# Patient Record
Sex: MALE | Race: WHITE | Employment: OTHER | URBAN - METROPOLITAN AREA
[De-identification: names, ages, dates, MRNs, and addresses within clinical notes are randomized per-mention and may not be internally consistent; named-entity substitution may affect disease eponyms.]

---

## 2022-03-18 PROBLEM — R97.20 ELEVATED PSA: Status: ACTIVE | Noted: 2017-03-02

## 2022-08-30 PROBLEM — R42 DIZZINESS: Status: ACTIVE | Noted: 2022-08-30

## 2022-08-30 PROBLEM — R77.8 ELEVATED TROPONIN: Status: ACTIVE | Noted: 2022-08-30

## 2022-08-30 PROBLEM — R79.89 ELEVATED TROPONIN: Status: ACTIVE | Noted: 2022-08-30

## 2022-08-30 PROBLEM — R55 NEAR SYNCOPE: Status: ACTIVE | Noted: 2022-08-30

## 2022-08-31 PROBLEM — I21.4 NSTEMI (NON-ST ELEVATED MYOCARDIAL INFARCTION) (HCC): Status: ACTIVE | Noted: 2022-08-31

## 2022-09-19 PROBLEM — F03.90 DEMENTIA (HCC): Status: ACTIVE | Noted: 2022-09-19

## 2022-09-29 PROBLEM — R77.8 ELEVATED TROPONIN: Status: RESOLVED | Noted: 2022-08-30 | Resolved: 2022-09-29

## 2022-09-29 PROBLEM — R79.89 ELEVATED TROPONIN: Status: RESOLVED | Noted: 2022-08-30 | Resolved: 2022-09-29

## 2023-01-05 PROBLEM — F03.90 DEMENTIA (HCC): Status: RESOLVED | Noted: 2022-09-19 | Resolved: 2023-01-05

## 2023-01-05 PROBLEM — D69.6 THROMBOCYTOPENIA (HCC): Status: ACTIVE | Noted: 2023-01-05

## 2023-01-05 PROBLEM — D70.9 NEUTROPENIA (HCC): Status: ACTIVE | Noted: 2023-01-05

## 2023-01-05 PROBLEM — R42 DIZZINESS: Status: RESOLVED | Noted: 2022-08-30 | Resolved: 2023-01-05

## 2023-01-05 PROBLEM — R97.20 ELEVATED PSA: Status: RESOLVED | Noted: 2017-03-02 | Resolved: 2023-01-05

## 2023-01-05 PROBLEM — R77.8 ELEVATED TROPONIN: Status: RESOLVED | Noted: 2022-08-30 | Resolved: 2023-01-05

## 2023-01-05 PROBLEM — N13.30 HYDRONEPHROSIS: Status: ACTIVE | Noted: 2023-01-05

## 2023-01-05 PROBLEM — R55 NEAR SYNCOPE: Status: RESOLVED | Noted: 2022-08-30 | Resolved: 2023-01-05

## 2023-01-05 PROBLEM — D61.818 PANCYTOPENIA (HCC): Status: ACTIVE | Noted: 2023-01-05

## 2023-01-05 PROBLEM — I21.4 NSTEMI (NON-ST ELEVATED MYOCARDIAL INFARCTION) (HCC): Status: RESOLVED | Noted: 2022-08-31 | Resolved: 2023-01-05

## 2023-01-05 PROBLEM — D64.9 ANEMIA: Status: ACTIVE | Noted: 2023-01-05

## 2023-01-05 PROBLEM — R79.89 ELEVATED TROPONIN: Status: RESOLVED | Noted: 2022-08-30 | Resolved: 2023-01-05

## 2023-01-06 PROBLEM — D69.3 IDIOPATHIC THROMBOCYTOPENIC PURPURA (ITP) (HCC): Status: ACTIVE | Noted: 2023-01-05

## 2023-04-25 ENCOUNTER — APPOINTMENT (OUTPATIENT)
Dept: INFUSION THERAPY | Age: 88
End: 2023-04-25
Payer: MEDICARE

## 2023-04-25 ENCOUNTER — HOSPITAL ENCOUNTER (OUTPATIENT)
Dept: INFUSION THERAPY | Age: 88
Discharge: HOME | End: 2023-04-25
Payer: MEDICARE

## 2023-04-25 ENCOUNTER — OFFICE VISIT (OUTPATIENT)
Dept: ONCOLOGY | Age: 88
End: 2023-04-25
Payer: MEDICARE

## 2023-04-25 VITALS
HEART RATE: 63 BPM | DIASTOLIC BLOOD PRESSURE: 65 MMHG | RESPIRATION RATE: 18 BRPM | SYSTOLIC BLOOD PRESSURE: 145 MMHG | OXYGEN SATURATION: 98 % | TEMPERATURE: 97.8 F

## 2023-04-25 VITALS
TEMPERATURE: 98.4 F | DIASTOLIC BLOOD PRESSURE: 69 MMHG | RESPIRATION RATE: 20 BRPM | BODY MASS INDEX: 26.14 KG/M2 | WEIGHT: 193 LBS | HEART RATE: 63 BPM | HEIGHT: 72 IN | SYSTOLIC BLOOD PRESSURE: 124 MMHG

## 2023-04-25 DIAGNOSIS — M25.512 CHRONIC LEFT SHOULDER PAIN: ICD-10-CM

## 2023-04-25 DIAGNOSIS — M21.922 ACQUIRED DEFORMITY OF LEFT SHOULDER: ICD-10-CM

## 2023-04-25 DIAGNOSIS — G89.29 CHRONIC LEFT SHOULDER PAIN: ICD-10-CM

## 2023-04-25 DIAGNOSIS — D69.3 IDIOPATHIC THROMBOCYTOPENIC PURPURA (ITP) (HCC): Primary | ICD-10-CM

## 2023-04-25 DIAGNOSIS — Z79.899 MEDICATION MANAGEMENT: ICD-10-CM

## 2023-04-25 LAB
ALBUMIN SERPL-MCNC: 3.8 G/DL (ref 3.5–5)
ALBUMIN/GLOB SERPL: 1.2 (ref 1.1–2.2)
ALP SERPL-CCNC: 98 U/L (ref 45–117)
ALT SERPL-CCNC: 24 U/L (ref 12–78)
ANION GAP SERPL CALC-SCNC: 0 MMOL/L (ref 5–15)
AST SERPL-CCNC: 23 U/L (ref 15–37)
BASOPHILS # BLD: 0.1 K/UL (ref 0–0.1)
BASOPHILS NFR BLD: 1 % (ref 0–1)
BILIRUB SERPL-MCNC: 0.5 MG/DL (ref 0.2–1)
BUN SERPL-MCNC: 19 MG/DL (ref 6–20)
BUN/CREAT SERPL: 19 (ref 12–20)
CALCIUM SERPL-MCNC: 9.2 MG/DL (ref 8.5–10.1)
CHLORIDE SERPL-SCNC: 108 MMOL/L (ref 97–108)
CO2 SERPL-SCNC: 31 MMOL/L (ref 21–32)
CREAT SERPL-MCNC: 1 MG/DL (ref 0.7–1.3)
DIFFERENTIAL METHOD BLD: ABNORMAL
EOSINOPHIL # BLD: 0.3 K/UL (ref 0–0.4)
EOSINOPHIL NFR BLD: 4 % (ref 0–7)
ERYTHROCYTE [DISTWIDTH] IN BLOOD BY AUTOMATED COUNT: 13.2 % (ref 11.5–14.5)
GLOBULIN SER CALC-MCNC: 3.1 G/DL (ref 2–4)
GLUCOSE SERPL-MCNC: 174 MG/DL (ref 65–100)
HCT VFR BLD AUTO: 36.1 % (ref 36.6–50.3)
HGB BLD-MCNC: 11.9 G/DL (ref 12.1–17)
IMM GRANULOCYTES # BLD AUTO: 0 K/UL (ref 0–0.04)
IMM GRANULOCYTES NFR BLD AUTO: 0 % (ref 0–0.5)
LYMPHOCYTES # BLD: 1.6 K/UL (ref 0.8–3.5)
LYMPHOCYTES NFR BLD: 24 % (ref 12–49)
MCH RBC QN AUTO: 30.1 PG (ref 26–34)
MCHC RBC AUTO-ENTMCNC: 33 G/DL (ref 30–36.5)
MCV RBC AUTO: 91.2 FL (ref 80–99)
MONOCYTES # BLD: 0.6 K/UL (ref 0–1)
MONOCYTES NFR BLD: 9 % (ref 5–13)
NEUTS SEG # BLD: 4.2 K/UL (ref 1.8–8)
NEUTS SEG NFR BLD: 62 % (ref 32–75)
NRBC # BLD: 0 K/UL (ref 0–0.01)
NRBC BLD-RTO: 0 PER 100 WBC
PLATELET # BLD AUTO: 150 K/UL (ref 150–400)
PMV BLD AUTO: 9.3 FL (ref 8.9–12.9)
POTASSIUM SERPL-SCNC: 3.8 MMOL/L (ref 3.5–5.1)
PROT SERPL-MCNC: 6.9 G/DL (ref 6.4–8.2)
RBC # BLD AUTO: 3.96 M/UL (ref 4.1–5.7)
SODIUM SERPL-SCNC: 139 MMOL/L (ref 136–145)
WBC # BLD AUTO: 6.7 K/UL (ref 4.1–11.1)

## 2023-04-25 PROCEDURE — G8417 CALC BMI ABV UP PARAM F/U: HCPCS | Performed by: INTERNAL MEDICINE

## 2023-04-25 PROCEDURE — 1123F ACP DISCUSS/DSCN MKR DOCD: CPT | Performed by: INTERNAL MEDICINE

## 2023-04-25 PROCEDURE — 80053 COMPREHEN METABOLIC PANEL: CPT

## 2023-04-25 PROCEDURE — G0463 HOSPITAL OUTPT CLINIC VISIT: HCPCS | Performed by: INTERNAL MEDICINE

## 2023-04-25 PROCEDURE — G8536 NO DOC ELDER MAL SCRN: HCPCS | Performed by: INTERNAL MEDICINE

## 2023-04-25 PROCEDURE — G8432 DEP SCR NOT DOC, RNG: HCPCS | Performed by: INTERNAL MEDICINE

## 2023-04-25 PROCEDURE — 99214 OFFICE O/P EST MOD 30 MIN: CPT | Performed by: INTERNAL MEDICINE

## 2023-04-25 PROCEDURE — 1101F PT FALLS ASSESS-DOCD LE1/YR: CPT | Performed by: INTERNAL MEDICINE

## 2023-04-25 PROCEDURE — G8427 DOCREV CUR MEDS BY ELIG CLIN: HCPCS | Performed by: INTERNAL MEDICINE

## 2023-04-25 PROCEDURE — 36415 COLL VENOUS BLD VENIPUNCTURE: CPT

## 2023-04-25 PROCEDURE — 85025 COMPLETE CBC W/AUTO DIFF WBC: CPT

## 2023-04-25 RX ORDER — SODIUM CHLORIDE 0.9 % (FLUSH) 0.9 %
5-40 SYRINGE (ML) INJECTION AS NEEDED
OUTPATIENT
Start: 2023-05-23

## 2023-04-25 RX ORDER — SODIUM CHLORIDE 9 MG/ML
5-40 INJECTION INTRAVENOUS AS NEEDED
OUTPATIENT
Start: 2023-05-23

## 2023-04-25 RX ORDER — SODIUM CHLORIDE 9 MG/ML
5-250 INJECTION, SOLUTION INTRAVENOUS AS NEEDED
OUTPATIENT
Start: 2023-05-23

## 2023-04-25 NOTE — PROGRESS NOTES
[facility] [place] at [place] [address] [address] [facility]  Ravin Dunks: [phone]  F: [phone]      Reason for Visit:   Arden Ly is a 82 y.o. male who is seen for follow up of ITP. Hematology Oncology Treatment History:     Diagnosis: ITP    Pathology:   2/1/23 Bone marrow biopsy: Normocellular marrow with maturing trilineage hematopoiesis including   mildly left shifted granulopoiesis without increase in blasts, see comment. Flow cytometry shows no diagnostic immunophenotypic abnormalities   Peripheral Blood:   Normochromic, normocytic anemia with no increase in schistocytes   Thrombocytopenia. White blood cells normal in number and morphology   CYTOGENETICS - Karyotype:45,X,-Y[16]/46,XY[4]; Interpretation: ABNORMAL MALE KARYOTYPE     Prior Treatment: None    Current Treatment: Promacta 50mg/d started 2/23/23, decreased to Promacta 25mg 3/14/23 and decreased to Promacta 12.5mg on 3/28/23. History of Present Illness:   Arden Ly is a pleasant 82 y.o. male with dementia who is seen for follow up of ITP. Patient was previously hospitalized at [address] [place] [address] in 9/2022 for thrombocytopenia and treated with Dex 40 mg/d x 4 days given concern for ITP. HIT/ MICHAEL at that time were negative. Pt responded well with steroids with normalization of PLT count. Pt had COVID infection in late Nov 2022, but had only mild symptoms. ITP relapse in early Jan 2023 was treated with Dex 40mg/d x 4 days with short-lived response. Then received 2 days of IVIG on 1/18/23 and 1/19/23. Pt ambulates without assistance. He lives at [facility]; has been there since Sept 2022. Has 2 daughters. Chantal Edwards lives locally and 2nd daughter lives in [facility] History: Patient is seen for relapsed ITP. He continues on Promacta 12.5mg PO daily for the last week. Was taking 25mg every other day until recently. Denies melena/hematochezia, hematuria or bleeding gums.  No rash or itching. Patient fell on is side and has been having left shoulder pain. He saw Orthopedics and was told he has dislocated shoulder. Has been receiving some type of pain patch. Accompanied by daughter, Corrine Falling. PMHX: BPH, Dementia, ED, HTN, Hydronephrosis, HLD, Right fascicular block  PSurgHx: None  SHX: , has significant other currently. Lives in [address] [place] [address]. Never smoker, no EtOH. FHX: No known h/o malignancy or blood disorders    Current Outpatient Medications   Medication Sig    ACETAMINOPHEN PO Take  by mouth. eltrombopag (Promacta) 12.5 mg tablet Take 1 Tablet by mouth Daily (before breakfast). donepeziL (ARICEPT) 5 mg tablet Take 1 Tablet by mouth nightly. QUEtiapine (SEROquel) 50 mg tablet Take 1 Tablet by mouth two (2) times a day. finasteride (PROSCAR) 5 mg tablet TAKE 1 TABLET DAILY    atorvastatin (LIPITOR) 40 mg tablet 10 mg. No current facility-administered medications for this visit. No Known Allergies   Review of Systems: A complete review of systems was obtained, reviewed. Pertinent findings reviewed above. Physical Exam:   Visit Vitals  /69 (BP 1 Location: Right upper arm, BP Patient Position: Sitting, BP Cuff Size: Large adult)   Pulse 63   Temp 98.4 °F (36.9 °C) (Temporal)   Resp 20   Ht 6' (1.829 m)   Wt 193 lb (87.5 kg)   BMI 26.18 kg/m²     ECOG PS: 2-3  General: no distress  Eyes: anicteric sclerae  HENT: oropharynx clear  Neck: supple  Lymphatic: deferred today   Respiratory: normal respiratory effort, CTAB  GI: soft, nontender, nondistended, no masses  Skin: no petechiae, rashes. Ecchymosis noted on bilateral arms. Deformity present on left shoulder with tenderness to palpation and bruising present. Neuro: limited judgement and insight due to dementia.       Results:     Lab Results   Component Value Date/Time    WBC 6.7 04/25/2023 09:10 AM    HGB 11.9 (L) 04/25/2023 09:10 AM    HCT 36.1 (L) 04/25/2023 09:10 AM    PLATELET 330 04/25/2023 09:10 AM    MCV 91.2 04/25/2023 09:10 AM    ABS. NEUTROPHILS 4.2 04/25/2023 09:10 AM     Lab Results   Component Value Date/Time    Sodium 137 04/11/2023 08:54 AM    Potassium 4.1 04/11/2023 08:54 AM    Chloride 106 04/11/2023 08:54 AM    CO2 28 04/11/2023 08:54 AM    Glucose 204 (H) 04/11/2023 08:54 AM    BUN 22 (H) 04/11/2023 08:54 AM    Creatinine 1.01 04/11/2023 08:54 AM    GFR est AA >60 09/21/2022 04:19 AM    GFR est non-AA >60 09/21/2022 04:19 AM    Calcium 8.8 04/11/2023 08:54 AM    Sodium (POC) 138 01/18/2023 12:08 PM    Potassium (POC) 4.5 01/18/2023 12:08 PM    Chloride (POC) 104 01/18/2023 12:08 PM    Glucose (POC) 76 01/18/2023 12:08 PM    Glucose (POC) 136 (H) 09/14/2022 04:38 PM    Creatinine (POC) 0.88 01/18/2023 12:08 PM    Calcium, ionized (POC) 1.13 01/18/2023 12:08 PM     Lab Results   Component Value Date/Time    Bilirubin, total 0.7 04/11/2023 08:54 AM    ALT (SGPT) 19 04/11/2023 08:54 AM    Alk.  phosphatase 94 04/11/2023 08:54 AM    Protein, total 7.0 04/11/2023 08:54 AM    Albumin 3.7 04/11/2023 08:54 AM    Globulin 3.3 04/11/2023 08:54 AM     Lab Results   Component Value Date/Time    Reticulocyte count 1.6 01/04/2023 10:00 PM    Iron % saturation 6 (L) 01/04/2023 11:49 PM    TIBC 274 01/04/2023 11:49 PM    Ferritin 146 01/04/2023 11:49 PM    Vitamin B12 284 01/04/2023 11:49 PM    Folate 17.6 01/05/2023 02:21 PM    Methylmalonic acid 141 01/06/2023 03:14 AM    Haptoglobin 146 01/04/2023 11:49 PM     01/04/2023 10:00 PM    Sed rate, automated 5 09/01/2022 08:50 AM    C-Reactive protein <0.29 09/01/2022 08:50 AM    TSH 3.13 09/09/2022 05:26 PM    M-Damon Not Observed 01/04/2023 11:49 PM       Lab Results   Component Value Date/Time    INR 1.1 01/04/2023 10:00 PM    aPTT 29.5 01/04/2023 10:00 PM    Fibrinogen 383 01/04/2023 10:00 PM      Lab Results   Component Value Date/Time    Prostate Specific Ag 4.3 (H) 04/10/2018 11:59 AM     01/04/2023 10:00 PM      PLATELET (K/uL) Date Value   04/11/2023 190   03/28/2023 207   03/21/2023 263   03/14/2023 237   03/07/2023 139 (L)   02/28/2023 99 (L)   02/14/2023 66 (L)   02/01/2023 85 (L)   01/19/2023 32 (LL)   01/18/2023 10 (LL)   01/06/2023 81 (L)   01/04/2023 33 (LL)   12/12/2022 252   09/23/2022 121 (L)   09/22/2022 46 (LL)   09/21/2022 18 (LL)   09/17/2022 30 (LL)   09/16/2022 36 (LL)   09/16/2022 40 (LL)   09/13/2022 54 (L)   09/12/2022 53 (L)   09/11/2022 61 (L)   09/10/2022 67 (L)   09/09/2022 68 (L)   09/08/2022 86 (L)   09/07/2022 95 (L)   09/06/2022 89 (L)   09/05/2022 73 (L)   09/04/2022 78 (L)   09/03/2022 91 (L)   09/01/2022 100 (L)   08/30/2022 92 (L)      1/4/2023 Peripheral Smear    Peripheral blood, smear:        Pancytopenia with absolute neutropenia and normocytic anemia (see   comment)   No circulating blasts, increase in schistocytes, or platelet clumping   identified   Pancytopenia has many potential contributing etiologies including   nutritional deficiency, medication effect, renal disease, and underlying   hematologic process such as myelodysplastic syndrome. Clinical correlation   is recommended; should cytopenias persist with no apparent explanation,   further evaluation including potential bone marrow biopsy may be   warranted. 1/14/23: WBC 6.5, Hgb 9.4, MCV 87, RDW 16.1, PLT 10  2/8/23: WBC 6.1, Hgb 10.1, MCV 86, RDW 16.8, PLT 38   2/23/23 started Promacta 50mg/d  3/1/23: WBC 8.2, HgB 11, MCV 88, RDW 16.7,   3/7/23:   3/14/23  - decreasing Promacta to 25mg/d  3/28/23:  - decreasing Promacta to 12.5mg daily. Assessment/Recommendations:   82 y.o. male with male with PMHx of dementia, HTN admitted with pancytopenia. Relapsed ITP:   Had prior response to pulse Dex in 9/2022 resulting in normal PLT count which is consistent with ITP diagnosis. Relapsed ITP in early Jan 2023 and only short-lived response to Dex 40mg/d x 4.  Did respond to IVIG on 1/18 and 1/19/23, but PLT count is now declining again. 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 Discussed treating with weekly Rituximab or splenectomy (which both carry a risk of immunosuppression), Promacta, Nplate, or Fostamatinib (which often require continuous dosing.) Discussed that treatment for refractory ITP is needed with PLT < 30-40 range. Patient has decided to proceed with Promacta. PLT count is currently 150.    -- Continue Promacta 12.5mg PO daily. ([facility] contact [phone])  -- Return for MD visit in and labs 4 weeks    PROMACTA DOSING:  - Use the lowest dose to achieve and maintain platelet count ? 50,000/mm3 as needed to reduce the risk of bleeding. Discontinue eltrombopag if platelet count does not respond to a level that avoids clinically important bleeding after 4 weeks at the maximum of 75 mg/day  - Dosage adjustment based on platelet response:  Platelet count <19,894/MI0 (?2 weeks after treatment initiation or a dose increase): Increase daily dose by 25 mg (if taking 12.5 mg once daily, increase dose to 25 mg once daily prior to increasing the dose amount by 25 mg daily); maximum: 75 mg/day. -Platelet count ? 200,000/mm3 and ?400,000/mm3 (at any time): Reduce daily dose by 25 mg (if taking 25 mg once daily, decrease dose to 12.5 mg once daily); reassess in 2 weeks.  -Platelet count >204,133/FD2: Withhold dose; assess platelet count twice weekly; when platelet count <199,046/BA5, resume with the daily dose reduced by 25 mg (if taking 25 mg once daily, resume with 12.5 mg once daily). -Platelet count >014,523/CZ9 after 2 weeks at the lowest dose: Discontinue treatment. 2. HTN:   Controlled on current medications. Hypotensive today but denies dizziness, CP or nausea. Encouraged good PO hydration. 3. Dementia:  Resides in [address] [place] [address]. 4. H/o fall / Left shoulder dislocation:   Not a candidate for surgery at this time. Orthopedics has evaluated and recommends conservative management. Advised fall precautions. Okay to use Lidocaine patches, Tylenol prn.        Signed By: Alondra Colindres MD

## 2023-04-25 NOTE — PROGRESS NOTES
[facility] Lab Visit:      [id number] arrived ambulatory and in no distress, labs drawn 1 stick in left wrist per KK  . Departed [facility] ambulatory and in no distress. Visit Vitals  BP (!) 145/65   Pulse 63   Temp 97.8 °F (36.6 °C)   Resp 18   SpO2 98%       Labs available in CC once resulted.

## 2023-04-25 NOTE — PROGRESS NOTES
1. Have you been to the ER, urgent care clinic since your last visit? Hospitalized since your last visit? No    2. Have you seen or consulted any other health care providers outside of the [address] [place] [address] since your last visit? Include any pap smears or colon screening.  No        Visit Vitals  /69 (BP 1 Location: Right upper arm, BP Patient Position: Sitting, BP Cuff Size: Large adult)   Pulse 63   Temp 98.4 °F (36.9 °C) (Temporal)   Resp 20   Ht 6' (1.829 m)   Wt 193 lb (87.5 kg)   BMI 26.18 kg/m²             Chief Complaint   Patient presents with    Follow-up

## 2023-05-01 DIAGNOSIS — D69.3 IDIOPATHIC THROMBOCYTOPENIC PURPURA (ITP) (HCC): Primary | ICD-10-CM

## 2023-05-01 RX ORDER — SODIUM CHLORIDE 0.9 % (FLUSH) 0.9 %
5-40 SYRINGE (ML) INJECTION PRN
OUTPATIENT
Start: 2023-05-09

## 2023-05-01 RX ORDER — SODIUM CHLORIDE 9 MG/ML
5-250 INJECTION, SOLUTION INTRAVENOUS PRN
OUTPATIENT
Start: 2023-05-09

## 2023-05-01 RX ORDER — HEPARIN SODIUM (PORCINE) LOCK FLUSH IV SOLN 100 UNIT/ML 100 UNIT/ML
500 SOLUTION INTRAVENOUS PRN
OUTPATIENT
Start: 2023-05-09

## 2023-05-09 ENCOUNTER — APPOINTMENT (OUTPATIENT)
Dept: INFUSION THERAPY | Age: 88
End: 2023-05-09

## 2023-05-09 ENCOUNTER — HOSPITAL ENCOUNTER (OUTPATIENT)
Dept: INFUSION THERAPY | Age: 88
End: 2023-05-09

## 2023-05-19 RX ORDER — FINASTERIDE 5 MG/1
1 TABLET, FILM COATED ORAL DAILY
COMMUNITY
Start: 2021-07-14

## 2023-05-19 RX ORDER — QUETIAPINE FUMARATE 50 MG/1
50 TABLET, FILM COATED ORAL 2 TIMES DAILY
COMMUNITY
Start: 2023-01-06

## 2023-05-19 RX ORDER — ATORVASTATIN CALCIUM 40 MG/1
10 TABLET, FILM COATED ORAL
COMMUNITY

## 2023-05-19 RX ORDER — DONEPEZIL HYDROCHLORIDE 5 MG/1
1 TABLET, FILM COATED ORAL NIGHTLY
COMMUNITY
Start: 2023-01-06

## 2023-05-23 ENCOUNTER — OFFICE VISIT (OUTPATIENT)
Age: 88
End: 2023-05-23
Payer: MEDICARE

## 2023-05-23 ENCOUNTER — HOSPITAL ENCOUNTER (OUTPATIENT)
Facility: HOSPITAL | Age: 88
Setting detail: INFUSION SERIES
End: 2023-05-23
Payer: MEDICARE

## 2023-05-23 VITALS
HEIGHT: 72 IN | HEART RATE: 64 BPM | BODY MASS INDEX: 26.06 KG/M2 | SYSTOLIC BLOOD PRESSURE: 127 MMHG | OXYGEN SATURATION: 99 % | DIASTOLIC BLOOD PRESSURE: 59 MMHG | WEIGHT: 192.4 LBS | TEMPERATURE: 97.5 F

## 2023-05-23 VITALS
DIASTOLIC BLOOD PRESSURE: 59 MMHG | HEART RATE: 64 BPM | TEMPERATURE: 97.5 F | SYSTOLIC BLOOD PRESSURE: 127 MMHG | OXYGEN SATURATION: 99 %

## 2023-05-23 DIAGNOSIS — D69.3 IDIOPATHIC THROMBOCYTOPENIC PURPURA (ITP) (HCC): Primary | ICD-10-CM

## 2023-05-23 DIAGNOSIS — F03.B0 MODERATE DEMENTIA WITHOUT BEHAVIORAL DISTURBANCE, PSYCHOTIC DISTURBANCE, MOOD DISTURBANCE, OR ANXIETY, UNSPECIFIED DEMENTIA TYPE (HCC): ICD-10-CM

## 2023-05-23 DIAGNOSIS — I10 ESSENTIAL (PRIMARY) HYPERTENSION: ICD-10-CM

## 2023-05-23 DIAGNOSIS — Z79.899 OTHER LONG TERM (CURRENT) DRUG THERAPY: ICD-10-CM

## 2023-05-23 DIAGNOSIS — Z51.81 ENCOUNTER FOR THERAPEUTIC DRUG LEVEL MONITORING: ICD-10-CM

## 2023-05-23 DIAGNOSIS — D69.3 IDIOPATHIC THROMBOCYTOPENIC PURPURA (ITP) (HCC): ICD-10-CM

## 2023-05-23 LAB
ALBUMIN SERPL-MCNC: 3.6 G/DL (ref 3.5–5)
ALBUMIN/GLOB SERPL: 1.2 (ref 1.1–2.2)
ALP SERPL-CCNC: 106 U/L (ref 45–117)
ALT SERPL-CCNC: 24 U/L (ref 12–78)
ANION GAP SERPL CALC-SCNC: 3 MMOL/L (ref 5–15)
AST SERPL-CCNC: 22 U/L (ref 15–37)
BASOPHILS # BLD: 0 K/UL (ref 0–0.1)
BASOPHILS NFR BLD: 1 % (ref 0–1)
BILIRUB SERPL-MCNC: 0.5 MG/DL (ref 0.2–1)
BUN SERPL-MCNC: 18 MG/DL (ref 6–20)
BUN/CREAT SERPL: 19 (ref 12–20)
CALCIUM SERPL-MCNC: 8.9 MG/DL (ref 8.5–10.1)
CHLORIDE SERPL-SCNC: 107 MMOL/L (ref 97–108)
CO2 SERPL-SCNC: 31 MMOL/L (ref 21–32)
CREAT SERPL-MCNC: 0.94 MG/DL (ref 0.7–1.3)
DIFFERENTIAL METHOD BLD: ABNORMAL
EOSINOPHIL # BLD: 0.2 K/UL (ref 0–0.4)
EOSINOPHIL NFR BLD: 3 % (ref 0–7)
ERYTHROCYTE [DISTWIDTH] IN BLOOD BY AUTOMATED COUNT: 12.8 % (ref 11.5–14.5)
GLOBULIN SER CALC-MCNC: 3 G/DL (ref 2–4)
GLUCOSE SERPL-MCNC: 196 MG/DL (ref 65–100)
HCT VFR BLD AUTO: 35.8 % (ref 36.6–50.3)
HGB BLD-MCNC: 11.9 G/DL (ref 12.1–17)
IMM GRANULOCYTES # BLD AUTO: 0 K/UL (ref 0–0.04)
IMM GRANULOCYTES NFR BLD AUTO: 0 % (ref 0–0.5)
LYMPHOCYTES # BLD: 1.5 K/UL (ref 0.8–3.5)
LYMPHOCYTES NFR BLD: 25 % (ref 12–49)
MCH RBC QN AUTO: 30 PG (ref 26–34)
MCHC RBC AUTO-ENTMCNC: 33.2 G/DL (ref 30–36.5)
MCV RBC AUTO: 90.2 FL (ref 80–99)
MONOCYTES # BLD: 0.4 K/UL (ref 0–1)
MONOCYTES NFR BLD: 7 % (ref 5–13)
NEUTS SEG # BLD: 3.7 K/UL (ref 1.8–8)
NEUTS SEG NFR BLD: 64 % (ref 32–75)
NRBC # BLD: 0 K/UL (ref 0–0.01)
NRBC BLD-RTO: 0 PER 100 WBC
PLATELET # BLD AUTO: 115 K/UL (ref 150–400)
PMV BLD AUTO: 9.6 FL (ref 8.9–12.9)
POTASSIUM SERPL-SCNC: 3.9 MMOL/L (ref 3.5–5.1)
PROT SERPL-MCNC: 6.6 G/DL (ref 6.4–8.2)
RBC # BLD AUTO: 3.97 M/UL (ref 4.1–5.7)
SODIUM SERPL-SCNC: 141 MMOL/L (ref 136–145)
WBC # BLD AUTO: 5.8 K/UL (ref 4.1–11.1)

## 2023-05-23 PROCEDURE — G8419 CALC BMI OUT NRM PARAM NOF/U: HCPCS | Performed by: INTERNAL MEDICINE

## 2023-05-23 PROCEDURE — 99214 OFFICE O/P EST MOD 30 MIN: CPT | Performed by: INTERNAL MEDICINE

## 2023-05-23 PROCEDURE — G8427 DOCREV CUR MEDS BY ELIG CLIN: HCPCS | Performed by: INTERNAL MEDICINE

## 2023-05-23 PROCEDURE — 85025 COMPLETE CBC W/AUTO DIFF WBC: CPT

## 2023-05-23 PROCEDURE — 36415 COLL VENOUS BLD VENIPUNCTURE: CPT

## 2023-05-23 PROCEDURE — 80053 COMPREHEN METABOLIC PANEL: CPT

## 2023-05-23 PROCEDURE — 1123F ACP DISCUSS/DSCN MKR DOCD: CPT | Performed by: INTERNAL MEDICINE

## 2023-05-23 PROCEDURE — 1036F TOBACCO NON-USER: CPT | Performed by: INTERNAL MEDICINE

## 2023-05-23 RX ORDER — SODIUM CHLORIDE 9 MG/ML
5-250 INJECTION, SOLUTION INTRAVENOUS PRN
OUTPATIENT
Start: 2023-05-23

## 2023-05-23 RX ORDER — SODIUM CHLORIDE 0.9 % (FLUSH) 0.9 %
5-40 SYRINGE (ML) INJECTION PRN
OUTPATIENT
Start: 2023-05-23

## 2023-05-23 RX ORDER — HEPARIN SODIUM (PORCINE) LOCK FLUSH IV SOLN 100 UNIT/ML 100 UNIT/ML
500 SOLUTION INTRAVENOUS PRN
OUTPATIENT
Start: 2023-05-23

## 2023-05-23 NOTE — PROGRESS NOTES
[facility] [place] at [address] [facility]   Anthony Luisito: [phone]  F: [phone]           Reason for Visit:   Lexx Varela is a 82 y.o. male who is seen for follow up of ITP. Hematology Oncology Treatment History:       Diagnosis: ITP     Pathology:   2/1/23 Bone marrow biopsy: Normocellular marrow with maturing trilineage hematopoiesis including   mildly left shifted granulopoiesis  without increase in blasts, see comment. Flow cytometry shows no diagnostic immunophenotypic abnormalities   Peripheral Blood:   Normochromic, normocytic anemia with no increase in schistocytes   Thrombocytopenia. White blood cells normal in number and morphology   CYTOGENETICS - Karyotype:45,X,-Y[16]/46,XY[4]; Interpretation: ABNORMAL MALE KARYOTYPE      Prior Treatment: None     Current Treatment: Promacta 50mg/d started 2/23/23, decreased to Promacta 25mg 3/14/23 and decreased to Promacta 12.5mg on 3/28/23. History of Present Illness:   Lexx Varela is a pleasant 82 y.o. male with dementia who is seen for follow up of ITP. Patient was previously hospitalized at [facility] in 9/2022 for thrombocytopenia and treated with Dex 40 mg/d x 4 days given concern for ITP. HIT/ THERESA at that time were negative. Pt responded well with steroids  with normalization of PLT count. Pt had COVID infection in late Nov 2022, but had only mild symptoms. ITP relapse in early Jan 2023 was treated with Dex 40mg/d x 4 days with short-lived response. Then received 2 days of IVIG on 1/18/23 and 1/19/23. Pt ambulates without assistance. He lives at [facility]; has been there since Sept 2022. Has 2 daughters. Renu Come lives locally and 2nd daughter lives in [facility] History: Patient is seen for relapsed ITP. He continues on Promacta 12.5mg PO daily for the last 4 weeks. Denies melena/hematochezia, hematuria, epistaxis or bleeding gums.  No rash or

## 2023-05-23 NOTE — PROGRESS NOTES
Fortunato Benavidez is a 82 y.o. male here for follow up of ITP. Patient with no complaints of pain at this time.

## 2023-05-30 RX ORDER — ELTROMBOPAG OLAMINE 12.5 MG/1
TABLET, FILM COATED ORAL
Qty: 30 TABLET | Refills: 1 | Status: ACTIVE | OUTPATIENT
Start: 2023-05-30 | End: 2023-07-17

## 2023-06-06 ENCOUNTER — APPOINTMENT (OUTPATIENT)
Facility: HOSPITAL | Age: 88
End: 2023-06-06
Payer: MEDICARE

## 2023-07-05 ENCOUNTER — HOSPITAL ENCOUNTER (OUTPATIENT)
Facility: HOSPITAL | Age: 88
Setting detail: INFUSION SERIES
End: 2023-07-05
Payer: MEDICARE

## 2023-07-05 VITALS
SYSTOLIC BLOOD PRESSURE: 120 MMHG | TEMPERATURE: 97.8 F | OXYGEN SATURATION: 96 % | HEART RATE: 57 BPM | DIASTOLIC BLOOD PRESSURE: 62 MMHG

## 2023-07-05 DIAGNOSIS — D69.3 IDIOPATHIC THROMBOCYTOPENIC PURPURA (ITP) (HCC): Primary | ICD-10-CM

## 2023-07-05 LAB
ALBUMIN SERPL-MCNC: 3.4 G/DL (ref 3.5–5)
ALBUMIN/GLOB SERPL: 1 (ref 1.1–2.2)
ALP SERPL-CCNC: 101 U/L (ref 45–117)
ALT SERPL-CCNC: 25 U/L (ref 12–78)
ANION GAP SERPL CALC-SCNC: 6 MMOL/L (ref 5–15)
AST SERPL-CCNC: 23 U/L (ref 15–37)
BASOPHILS # BLD: 0.1 K/UL (ref 0–0.1)
BASOPHILS NFR BLD: 1 % (ref 0–1)
BILIRUB SERPL-MCNC: 0.5 MG/DL (ref 0.2–1)
BUN SERPL-MCNC: 21 MG/DL (ref 6–20)
BUN/CREAT SERPL: 19 (ref 12–20)
CALCIUM SERPL-MCNC: 8.8 MG/DL (ref 8.5–10.1)
CHLORIDE SERPL-SCNC: 107 MMOL/L (ref 97–108)
CO2 SERPL-SCNC: 27 MMOL/L (ref 21–32)
CREAT SERPL-MCNC: 1.11 MG/DL (ref 0.7–1.3)
DIFFERENTIAL METHOD BLD: ABNORMAL
EOSINOPHIL # BLD: 0.3 K/UL (ref 0–0.4)
EOSINOPHIL NFR BLD: 4 % (ref 0–7)
ERYTHROCYTE [DISTWIDTH] IN BLOOD BY AUTOMATED COUNT: 12.9 % (ref 11.5–14.5)
GLOBULIN SER CALC-MCNC: 3.3 G/DL (ref 2–4)
GLUCOSE SERPL-MCNC: 190 MG/DL (ref 65–100)
HCT VFR BLD AUTO: 35.9 % (ref 36.6–50.3)
HGB BLD-MCNC: 11.8 G/DL (ref 12.1–17)
IMM GRANULOCYTES # BLD AUTO: 0 K/UL (ref 0–0.04)
IMM GRANULOCYTES NFR BLD AUTO: 0 % (ref 0–0.5)
LYMPHOCYTES # BLD: 1.4 K/UL (ref 0.8–3.5)
LYMPHOCYTES NFR BLD: 22 % (ref 12–49)
MCH RBC QN AUTO: 30.6 PG (ref 26–34)
MCHC RBC AUTO-ENTMCNC: 32.9 G/DL (ref 30–36.5)
MCV RBC AUTO: 93.2 FL (ref 80–99)
MONOCYTES # BLD: 0.6 K/UL (ref 0–1)
MONOCYTES NFR BLD: 9 % (ref 5–13)
NEUTS SEG # BLD: 4.2 K/UL (ref 1.8–8)
NEUTS SEG NFR BLD: 64 % (ref 32–75)
NRBC # BLD: 0 K/UL (ref 0–0.01)
NRBC BLD-RTO: 0 PER 100 WBC
PLATELET # BLD AUTO: 163 K/UL (ref 150–400)
PMV BLD AUTO: 9.8 FL (ref 8.9–12.9)
POTASSIUM SERPL-SCNC: 4.1 MMOL/L (ref 3.5–5.1)
PROT SERPL-MCNC: 6.7 G/DL (ref 6.4–8.2)
RBC # BLD AUTO: 3.85 M/UL (ref 4.1–5.7)
SODIUM SERPL-SCNC: 140 MMOL/L (ref 136–145)
WBC # BLD AUTO: 6.5 K/UL (ref 4.1–11.1)

## 2023-07-05 PROCEDURE — 36415 COLL VENOUS BLD VENIPUNCTURE: CPT

## 2023-07-05 PROCEDURE — 85025 COMPLETE CBC W/AUTO DIFF WBC: CPT

## 2023-07-05 PROCEDURE — 80053 COMPREHEN METABOLIC PANEL: CPT

## 2023-07-17 RX ORDER — ELTROMBOPAG OLAMINE 12.5 MG/1
TABLET, FILM COATED ORAL
Qty: 30 TABLET | Refills: 1 | Status: ACTIVE | OUTPATIENT
Start: 2023-07-17

## 2023-08-15 ENCOUNTER — HOSPITAL ENCOUNTER (OUTPATIENT)
Facility: HOSPITAL | Age: 88
Setting detail: INFUSION SERIES
End: 2023-08-15
Payer: MEDICARE

## 2023-08-17 ENCOUNTER — OFFICE VISIT (OUTPATIENT)
Age: 88
End: 2023-08-17
Payer: MEDICARE

## 2023-08-17 ENCOUNTER — HOSPITAL ENCOUNTER (OUTPATIENT)
Facility: HOSPITAL | Age: 88
Setting detail: INFUSION SERIES
End: 2023-08-17
Payer: MEDICARE

## 2023-08-17 VITALS
DIASTOLIC BLOOD PRESSURE: 62 MMHG | SYSTOLIC BLOOD PRESSURE: 133 MMHG | RESPIRATION RATE: 18 BRPM | WEIGHT: 192.4 LBS | BODY MASS INDEX: 26.06 KG/M2 | OXYGEN SATURATION: 97 % | TEMPERATURE: 98.2 F | HEART RATE: 55 BPM | HEIGHT: 72 IN

## 2023-08-17 VITALS
DIASTOLIC BLOOD PRESSURE: 62 MMHG | SYSTOLIC BLOOD PRESSURE: 133 MMHG | TEMPERATURE: 98.2 F | HEART RATE: 55 BPM | OXYGEN SATURATION: 97 %

## 2023-08-17 DIAGNOSIS — Z79.899 OTHER LONG TERM (CURRENT) DRUG THERAPY: ICD-10-CM

## 2023-08-17 DIAGNOSIS — F03.B0 MODERATE DEMENTIA WITHOUT BEHAVIORAL DISTURBANCE, PSYCHOTIC DISTURBANCE, MOOD DISTURBANCE, OR ANXIETY, UNSPECIFIED DEMENTIA TYPE (HCC): ICD-10-CM

## 2023-08-17 DIAGNOSIS — D69.3 IDIOPATHIC THROMBOCYTOPENIC PURPURA (ITP) (HCC): ICD-10-CM

## 2023-08-17 DIAGNOSIS — I10 ESSENTIAL (PRIMARY) HYPERTENSION: ICD-10-CM

## 2023-08-17 DIAGNOSIS — D69.3 IDIOPATHIC THROMBOCYTOPENIC PURPURA (ITP) (HCC): Primary | ICD-10-CM

## 2023-08-17 DIAGNOSIS — Z51.81 ENCOUNTER FOR THERAPEUTIC DRUG LEVEL MONITORING: ICD-10-CM

## 2023-08-17 LAB
ALBUMIN SERPL-MCNC: 3.4 G/DL (ref 3.5–5)
ALBUMIN/GLOB SERPL: 1.1 (ref 1.1–2.2)
ALP SERPL-CCNC: 98 U/L (ref 45–117)
ALT SERPL-CCNC: 21 U/L (ref 12–78)
ANION GAP SERPL CALC-SCNC: 4 MMOL/L (ref 5–15)
AST SERPL-CCNC: 17 U/L (ref 15–37)
BASOPHILS # BLD: 0.1 K/UL (ref 0–0.1)
BASOPHILS NFR BLD: 1 % (ref 0–1)
BILIRUB SERPL-MCNC: 0.7 MG/DL (ref 0.2–1)
BUN SERPL-MCNC: 24 MG/DL (ref 6–20)
BUN/CREAT SERPL: 24 (ref 12–20)
CALCIUM SERPL-MCNC: 8.9 MG/DL (ref 8.5–10.1)
CHLORIDE SERPL-SCNC: 110 MMOL/L (ref 97–108)
CO2 SERPL-SCNC: 26 MMOL/L (ref 21–32)
CREAT SERPL-MCNC: 1.01 MG/DL (ref 0.7–1.3)
DIFFERENTIAL METHOD BLD: ABNORMAL
EOSINOPHIL # BLD: 0.2 K/UL (ref 0–0.4)
EOSINOPHIL NFR BLD: 4 % (ref 0–7)
ERYTHROCYTE [DISTWIDTH] IN BLOOD BY AUTOMATED COUNT: 12.3 % (ref 11.5–14.5)
GLOBULIN SER CALC-MCNC: 3.1 G/DL (ref 2–4)
GLUCOSE SERPL-MCNC: 214 MG/DL (ref 65–100)
HCT VFR BLD AUTO: 36.8 % (ref 36.6–50.3)
HGB BLD-MCNC: 12 G/DL (ref 12.1–17)
IMM GRANULOCYTES # BLD AUTO: 0 K/UL (ref 0–0.04)
IMM GRANULOCYTES NFR BLD AUTO: 0 % (ref 0–0.5)
LYMPHOCYTES # BLD: 1.5 K/UL (ref 0.8–3.5)
LYMPHOCYTES NFR BLD: 23 % (ref 12–49)
MCH RBC QN AUTO: 30.3 PG (ref 26–34)
MCHC RBC AUTO-ENTMCNC: 32.6 G/DL (ref 30–36.5)
MCV RBC AUTO: 92.9 FL (ref 80–99)
MONOCYTES # BLD: 0.6 K/UL (ref 0–1)
MONOCYTES NFR BLD: 10 % (ref 5–13)
NEUTS SEG # BLD: 4 K/UL (ref 1.8–8)
NEUTS SEG NFR BLD: 62 % (ref 32–75)
NRBC # BLD: 0 K/UL (ref 0–0.01)
NRBC BLD-RTO: 0 PER 100 WBC
PLATELET # BLD AUTO: 166 K/UL (ref 150–400)
PMV BLD AUTO: 10.1 FL (ref 8.9–12.9)
POTASSIUM SERPL-SCNC: 4.2 MMOL/L (ref 3.5–5.1)
PROT SERPL-MCNC: 6.5 G/DL (ref 6.4–8.2)
RBC # BLD AUTO: 3.96 M/UL (ref 4.1–5.7)
SODIUM SERPL-SCNC: 140 MMOL/L (ref 136–145)
WBC # BLD AUTO: 6.4 K/UL (ref 4.1–11.1)

## 2023-08-17 PROCEDURE — 36415 COLL VENOUS BLD VENIPUNCTURE: CPT

## 2023-08-17 PROCEDURE — 99214 OFFICE O/P EST MOD 30 MIN: CPT | Performed by: INTERNAL MEDICINE

## 2023-08-17 PROCEDURE — 1036F TOBACCO NON-USER: CPT | Performed by: INTERNAL MEDICINE

## 2023-08-17 PROCEDURE — 85025 COMPLETE CBC W/AUTO DIFF WBC: CPT

## 2023-08-17 PROCEDURE — 80053 COMPREHEN METABOLIC PANEL: CPT

## 2023-08-17 PROCEDURE — 1123F ACP DISCUSS/DSCN MKR DOCD: CPT | Performed by: INTERNAL MEDICINE

## 2023-08-17 PROCEDURE — G8419 CALC BMI OUT NRM PARAM NOF/U: HCPCS | Performed by: INTERNAL MEDICINE

## 2023-08-17 PROCEDURE — G8427 DOCREV CUR MEDS BY ELIG CLIN: HCPCS | Performed by: INTERNAL MEDICINE

## 2023-08-17 RX ORDER — ELTROMBOPAG OLAMINE 12.5 MG/1
TABLET, FILM COATED ORAL
Qty: 30 TABLET | Refills: 5 | Status: ACTIVE | OUTPATIENT
Start: 2023-08-17

## 2023-08-17 ASSESSMENT — PATIENT HEALTH QUESTIONNAIRE - PHQ9
1. LITTLE INTEREST OR PLEASURE IN DOING THINGS: 0
SUM OF ALL RESPONSES TO PHQ QUESTIONS 1-9: 0
SUM OF ALL RESPONSES TO PHQ QUESTIONS 1-9: 0
SUM OF ALL RESPONSES TO PHQ9 QUESTIONS 1 & 2: 0
2. FEELING DOWN, DEPRESSED OR HOPELESS: 0
SUM OF ALL RESPONSES TO PHQ QUESTIONS 1-9: 0
SUM OF ALL RESPONSES TO PHQ QUESTIONS 1-9: 0

## 2023-08-17 NOTE — PROGRESS NOTES
[facility]cer [place] at [address], [address] [place] [address]   Selena Hoots: [phone]  F: [phone]           Reason for Visit:   Paula Reed is a 82 y.o. male who is seen for follow up of ITP. Hematology Oncology Treatment History:       Diagnosis: ITP     Pathology:   2/1/23 Bone marrow biopsy: Normocellular marrow with maturing trilineage hematopoiesis including   mildly left shifted granulopoiesis  without increase in blasts, see comment. Flow cytometry shows no diagnostic immunophenotypic abnormalities   Peripheral Blood:   Normochromic, normocytic anemia with no increase in schistocytes   Thrombocytopenia. White blood cells normal in number and morphology   CYTOGENETICS - Karyotype:45,X,-Y[16]/46,XY[4]; Interpretation: ABNORMAL MALE KARYOTYPE      Prior Treatment: None     Current Treatment: Promacta 50mg/d started 2/23/23, decreased to Promacta 25mg 3/14/23 and decreased to Promacta 12.5mg on 3/28/23. History of Present Illness:   Paula Reed is a pleasant 82 y.o. male with dementia who is seen for follow up of ITP. Patient was previously hospitalized at [facility] ER in 9/2022 for thrombocytopenia and treated with Dex 40 mg/d x 4 days given concern for ITP. HIT/ THERESA at that time were negative. Pt responded well with steroids  with normalization of PLT count. Pt had COVID infection in late Nov 2022, but had only mild symptoms. ITP relapse in early Jan 2023 was treated with Dex 40mg/d x 4 days with short-lived response. Then received 2 days of IVIG on 1/18/23 and 1/19/23. Pt ambulates without assistance. He lives at [facility]; has been there since Sept 2022. Has 2 daughters. Anahy Saragosa lives locally and 2nd daughter lives in [place]     Interval History: Patient is seen for relapsed ITP. He continues on Promacta 12.5mg PO daily and has been tolerating well. Denies melena/hematochezia, hematuria, epistaxis or bleeding gums.  No rash or

## 2023-09-25 ENCOUNTER — TELEPHONE (OUTPATIENT)
Age: 88
End: 2023-09-25

## 2023-09-25 NOTE — TELEPHONE ENCOUNTER
09/25/23 4:41 PM Return call placed to [facility]. Verified patient ID x 2. [facility] inquiring about patient's appointment schedule. [facility] states recent platelet counts have been stable and she thought that Dr. Waleska Callahan had advised that patient could follow up every three months. Per most recent note, patient to have labs every 6 weeks and MD visit every 12 weeks, coordinating with lab appointment in [facility]. Confirmed above with Dr. Waleska Callahan. [facility] voiced understanding. Call was then disconnected.

## 2023-09-25 NOTE — TELEPHONE ENCOUNTER
PT's legal guardian [employer] called in for further clarification regarding Naldo's treatment plans. Spoke to Dr. Saima Telles team and relayed the plan for labs every 6 weeks and FUP with Dr. Juliet Bailey in 3 months.      Ladonna requesting a call back from the team, she would like further clarification regarding the plan of care for Mr. Vidal Leon.    # [phone]

## 2023-09-26 ENCOUNTER — HOSPITAL ENCOUNTER (OUTPATIENT)
Facility: HOSPITAL | Age: 88
Setting detail: INFUSION SERIES
End: 2023-09-26
Payer: MEDICARE

## 2023-09-26 VITALS
RESPIRATION RATE: 16 BRPM | OXYGEN SATURATION: 97 % | DIASTOLIC BLOOD PRESSURE: 60 MMHG | TEMPERATURE: 95.5 F | HEART RATE: 55 BPM | SYSTOLIC BLOOD PRESSURE: 122 MMHG

## 2023-09-26 DIAGNOSIS — D69.3 IDIOPATHIC THROMBOCYTOPENIC PURPURA (ITP) (HCC): ICD-10-CM

## 2023-09-26 LAB
ALBUMIN SERPL-MCNC: 3.5 G/DL (ref 3.5–5)
ALBUMIN/GLOB SERPL: 1.1 (ref 1.1–2.2)
ALP SERPL-CCNC: 105 U/L (ref 45–117)
ALT SERPL-CCNC: 23 U/L (ref 12–78)
ANION GAP SERPL CALC-SCNC: 2 MMOL/L (ref 5–15)
AST SERPL-CCNC: 19 U/L (ref 15–37)
BASOPHILS # BLD: 0 K/UL (ref 0–0.1)
BASOPHILS NFR BLD: 1 % (ref 0–1)
BILIRUB SERPL-MCNC: 0.8 MG/DL (ref 0.2–1)
BUN SERPL-MCNC: 25 MG/DL (ref 6–20)
BUN/CREAT SERPL: 21 (ref 12–20)
CALCIUM SERPL-MCNC: 8.6 MG/DL (ref 8.5–10.1)
CHLORIDE SERPL-SCNC: 108 MMOL/L (ref 97–108)
CO2 SERPL-SCNC: 28 MMOL/L (ref 21–32)
CREAT SERPL-MCNC: 1.18 MG/DL (ref 0.7–1.3)
DIFFERENTIAL METHOD BLD: ABNORMAL
EOSINOPHIL # BLD: 0.2 K/UL (ref 0–0.4)
EOSINOPHIL NFR BLD: 3 % (ref 0–7)
ERYTHROCYTE [DISTWIDTH] IN BLOOD BY AUTOMATED COUNT: 12.3 % (ref 11.5–14.5)
GLOBULIN SER CALC-MCNC: 3.2 G/DL (ref 2–4)
GLUCOSE SERPL-MCNC: 198 MG/DL (ref 65–100)
HCT VFR BLD AUTO: 37.3 % (ref 36.6–50.3)
HGB BLD-MCNC: 12.1 G/DL (ref 12.1–17)
IMM GRANULOCYTES # BLD AUTO: 0 K/UL (ref 0–0.04)
IMM GRANULOCYTES NFR BLD AUTO: 0 % (ref 0–0.5)
LYMPHOCYTES # BLD: 1.7 K/UL (ref 0.8–3.5)
LYMPHOCYTES NFR BLD: 27 % (ref 12–49)
MCH RBC QN AUTO: 30 PG (ref 26–34)
MCHC RBC AUTO-ENTMCNC: 32.4 G/DL (ref 30–36.5)
MCV RBC AUTO: 92.3 FL (ref 80–99)
MONOCYTES # BLD: 0.5 K/UL (ref 0–1)
MONOCYTES NFR BLD: 8 % (ref 5–13)
NEUTS SEG # BLD: 4 K/UL (ref 1.8–8)
NEUTS SEG NFR BLD: 61 % (ref 32–75)
NRBC # BLD: 0 K/UL (ref 0–0.01)
NRBC BLD-RTO: 0 PER 100 WBC
PLATELET # BLD AUTO: 127 K/UL (ref 150–400)
PMV BLD AUTO: 10.7 FL (ref 8.9–12.9)
POTASSIUM SERPL-SCNC: 4.2 MMOL/L (ref 3.5–5.1)
PROT SERPL-MCNC: 6.7 G/DL (ref 6.4–8.2)
RBC # BLD AUTO: 4.04 M/UL (ref 4.1–5.7)
SODIUM SERPL-SCNC: 138 MMOL/L (ref 136–145)
WBC # BLD AUTO: 6.5 K/UL (ref 4.1–11.1)

## 2023-09-26 PROCEDURE — 80053 COMPREHEN METABOLIC PANEL: CPT

## 2023-09-26 PROCEDURE — 85025 COMPLETE CBC W/AUTO DIFF WBC: CPT

## 2023-09-26 PROCEDURE — 36415 COLL VENOUS BLD VENIPUNCTURE: CPT

## 2023-11-06 NOTE — PROGRESS NOTES
[facility] [place] at [facility]   [address] [place] [address], [address] [place] [address]   Alize Like: [phone]  F: [phone]           Reason for Visit:   Page Art is a 82 y.o. male who is seen for follow up of ITP. Hematology Oncology Treatment History:       Diagnosis: ITP     Pathology:   2/1/23 Bone marrow biopsy: Normocellular marrow with maturing trilineage hematopoiesis including   mildly left shifted granulopoiesis  without increase in blasts, see comment. Flow cytometry shows no diagnostic immunophenotypic abnormalities   Peripheral Blood:   Normochromic, normocytic anemia with no increase in schistocytes   Thrombocytopenia. White blood cells normal in number and morphology   CYTOGENETICS - Karyotype:45,X,-Y[16]/46,XY[4]; Interpretation: ABNORMAL MALE KARYOTYPE      Prior Treatment: None     Current Treatment: Promacta 50mg/d started 2/23/23, decreased to Promacta 25mg 3/14/23 and decreased to Promacta 12.5mg on 3/28/23. History of Present Illness:   Page Art is a pleasant 82 y.o. male with dementia who is seen for follow up of ITP. Patient was previously hospitalized at [facility] in 9/2022 for thrombocytopenia and treated with Dex 40 mg/d x 4 days given concern for ITP. HIT/ THERESA at that time were negative. Pt responded well with steroids  with normalization of PLT count. Pt had COVID infection in late Nov 2022, but had only mild symptoms. ITP relapse in early Jan 2023 was treated with Dex 40mg/d x 4 days with short-lived response. Then received 2 days of IVIG on 1/18/23 and 1/19/23. Pt ambulates without assistance. He lives at [facility]; has been there since Sept 2022. Has 2 daughters. Svitlana [place] lives locally and 2nd daughter lives in [place]     Interval History: Patient is seen for relapsed ITP. He continues on Promacta 12.5mg PO daily and has been tolerating well. Denies melena/hematochezia, hematuria or epistaxis.        Accompanied by

## 2023-11-07 ENCOUNTER — HOSPITAL ENCOUNTER (OUTPATIENT)
Facility: HOSPITAL | Age: 88
Setting detail: INFUSION SERIES
Discharge: HOME | End: 2023-11-07
Payer: MEDICARE

## 2023-11-07 ENCOUNTER — OFFICE VISIT (OUTPATIENT)
Age: 88
End: 2023-11-07
Payer: MEDICARE

## 2023-11-07 VITALS
RESPIRATION RATE: 16 BRPM | HEIGHT: 72 IN | OXYGEN SATURATION: 98 % | DIASTOLIC BLOOD PRESSURE: 71 MMHG | HEART RATE: 59 BPM | WEIGHT: 191 LBS | SYSTOLIC BLOOD PRESSURE: 129 MMHG | BODY MASS INDEX: 25.87 KG/M2 | TEMPERATURE: 97.3 F

## 2023-11-07 DIAGNOSIS — D69.3 IDIOPATHIC THROMBOCYTOPENIC PURPURA (ITP) (HCC): ICD-10-CM

## 2023-11-07 DIAGNOSIS — F03.B0 MODERATE DEMENTIA WITHOUT BEHAVIORAL DISTURBANCE, PSYCHOTIC DISTURBANCE, MOOD DISTURBANCE, OR ANXIETY, UNSPECIFIED DEMENTIA TYPE (HCC): ICD-10-CM

## 2023-11-07 DIAGNOSIS — D69.3 IDIOPATHIC THROMBOCYTOPENIC PURPURA (ITP) (HCC): Primary | ICD-10-CM

## 2023-11-07 DIAGNOSIS — Z51.81 ENCOUNTER FOR THERAPEUTIC DRUG LEVEL MONITORING: ICD-10-CM

## 2023-11-07 DIAGNOSIS — Z79.899 OTHER LONG TERM (CURRENT) DRUG THERAPY: ICD-10-CM

## 2023-11-07 LAB
ALBUMIN SERPL-MCNC: 3.6 G/DL (ref 3.5–5)
ALBUMIN/GLOB SERPL: 1.1 (ref 1.1–2.2)
ALP SERPL-CCNC: 98 U/L (ref 45–117)
ALT SERPL-CCNC: 24 U/L (ref 12–78)
ANION GAP SERPL CALC-SCNC: 4 MMOL/L (ref 5–15)
AST SERPL-CCNC: 22 U/L (ref 15–37)
BASOPHILS # BLD: 0 K/UL (ref 0–0.1)
BASOPHILS NFR BLD: 1 % (ref 0–1)
BILIRUB SERPL-MCNC: 0.6 MG/DL (ref 0.2–1)
BUN SERPL-MCNC: 24 MG/DL (ref 6–20)
BUN/CREAT SERPL: 21 (ref 12–20)
CALCIUM SERPL-MCNC: 8.8 MG/DL (ref 8.5–10.1)
CHLORIDE SERPL-SCNC: 110 MMOL/L (ref 97–108)
CO2 SERPL-SCNC: 28 MMOL/L (ref 21–32)
CREAT SERPL-MCNC: 1.12 MG/DL (ref 0.7–1.3)
DIFFERENTIAL METHOD BLD: ABNORMAL
EOSINOPHIL # BLD: 0.2 K/UL (ref 0–0.4)
EOSINOPHIL NFR BLD: 2 % (ref 0–7)
ERYTHROCYTE [DISTWIDTH] IN BLOOD BY AUTOMATED COUNT: 12.5 % (ref 11.5–14.5)
GLOBULIN SER CALC-MCNC: 3.2 G/DL (ref 2–4)
GLUCOSE SERPL-MCNC: 156 MG/DL (ref 65–100)
HCT VFR BLD AUTO: 37 % (ref 36.6–50.3)
HGB BLD-MCNC: 11.9 G/DL (ref 12.1–17)
IMM GRANULOCYTES # BLD AUTO: 0 K/UL (ref 0–0.04)
IMM GRANULOCYTES NFR BLD AUTO: 0 % (ref 0–0.5)
LYMPHOCYTES # BLD: 1.7 K/UL (ref 0.8–3.5)
LYMPHOCYTES NFR BLD: 23 % (ref 12–49)
MCH RBC QN AUTO: 29.6 PG (ref 26–34)
MCHC RBC AUTO-ENTMCNC: 32.2 G/DL (ref 30–36.5)
MCV RBC AUTO: 92 FL (ref 80–99)
MONOCYTES # BLD: 0.6 K/UL (ref 0–1)
MONOCYTES NFR BLD: 8 % (ref 5–13)
NEUTS SEG # BLD: 4.9 K/UL (ref 1.8–8)
NEUTS SEG NFR BLD: 66 % (ref 32–75)
NRBC # BLD: 0 K/UL (ref 0–0.01)
NRBC BLD-RTO: 0 PER 100 WBC
PLATELET # BLD AUTO: 251 K/UL (ref 150–400)
PMV BLD AUTO: 9.3 FL (ref 8.9–12.9)
POTASSIUM SERPL-SCNC: 3.8 MMOL/L (ref 3.5–5.1)
PROT SERPL-MCNC: 6.8 G/DL (ref 6.4–8.2)
RBC # BLD AUTO: 4.02 M/UL (ref 4.1–5.7)
SODIUM SERPL-SCNC: 142 MMOL/L (ref 136–145)
WBC # BLD AUTO: 7.5 K/UL (ref 4.1–11.1)

## 2023-11-07 PROCEDURE — 1123F ACP DISCUSS/DSCN MKR DOCD: CPT | Performed by: INTERNAL MEDICINE

## 2023-11-07 PROCEDURE — 36415 COLL VENOUS BLD VENIPUNCTURE: CPT

## 2023-11-07 PROCEDURE — G8427 DOCREV CUR MEDS BY ELIG CLIN: HCPCS | Performed by: INTERNAL MEDICINE

## 2023-11-07 PROCEDURE — G8484 FLU IMMUNIZE NO ADMIN: HCPCS | Performed by: INTERNAL MEDICINE

## 2023-11-07 PROCEDURE — G8419 CALC BMI OUT NRM PARAM NOF/U: HCPCS | Performed by: INTERNAL MEDICINE

## 2023-11-07 PROCEDURE — 99214 OFFICE O/P EST MOD 30 MIN: CPT | Performed by: INTERNAL MEDICINE

## 2023-11-07 PROCEDURE — 1036F TOBACCO NON-USER: CPT | Performed by: INTERNAL MEDICINE

## 2023-11-07 PROCEDURE — 80053 COMPREHEN METABOLIC PANEL: CPT

## 2023-11-07 PROCEDURE — 85025 COMPLETE CBC W/AUTO DIFF WBC: CPT

## 2023-12-19 ENCOUNTER — HOSPITAL ENCOUNTER (OUTPATIENT)
Facility: HOSPITAL | Age: 88
Setting detail: INFUSION SERIES
Discharge: HOME | End: 2023-12-19
Payer: MEDICARE

## 2023-12-19 VITALS
DIASTOLIC BLOOD PRESSURE: 60 MMHG | RESPIRATION RATE: 12 BRPM | TEMPERATURE: 97.1 F | HEART RATE: 56 BPM | SYSTOLIC BLOOD PRESSURE: 124 MMHG | OXYGEN SATURATION: 99 %

## 2023-12-19 DIAGNOSIS — D69.3 IDIOPATHIC THROMBOCYTOPENIC PURPURA (ITP) (HCC): ICD-10-CM

## 2023-12-19 LAB
ALBUMIN SERPL-MCNC: 3.7 G/DL (ref 3.5–5)
ALBUMIN/GLOB SERPL: 1.1 (ref 1.1–2.2)
ALP SERPL-CCNC: 110 U/L (ref 45–117)
ALT SERPL-CCNC: 19 U/L (ref 12–78)
ANION GAP SERPL CALC-SCNC: 4 MMOL/L (ref 5–15)
AST SERPL-CCNC: 21 U/L (ref 15–37)
BASOPHILS # BLD: 0 K/UL (ref 0–0.1)
BASOPHILS NFR BLD: 0 % (ref 0–1)
BILIRUB SERPL-MCNC: 0.7 MG/DL (ref 0.2–1)
BUN SERPL-MCNC: 19 MG/DL (ref 6–20)
BUN/CREAT SERPL: 19 (ref 12–20)
CALCIUM SERPL-MCNC: 8.8 MG/DL (ref 8.5–10.1)
CHLORIDE SERPL-SCNC: 105 MMOL/L (ref 97–108)
CO2 SERPL-SCNC: 30 MMOL/L (ref 21–32)
CREAT SERPL-MCNC: 1.01 MG/DL (ref 0.7–1.3)
DIFFERENTIAL METHOD BLD: NORMAL
EOSINOPHIL # BLD: 0.2 K/UL (ref 0–0.4)
EOSINOPHIL NFR BLD: 2 % (ref 0–7)
ERYTHROCYTE [DISTWIDTH] IN BLOOD BY AUTOMATED COUNT: 12.5 % (ref 11.5–14.5)
GLOBULIN SER CALC-MCNC: 3.3 G/DL (ref 2–4)
GLUCOSE SERPL-MCNC: 165 MG/DL (ref 65–100)
HCT VFR BLD AUTO: 39 % (ref 36.6–50.3)
HGB BLD-MCNC: 13.1 G/DL (ref 12.1–17)
IMM GRANULOCYTES # BLD AUTO: 0 K/UL (ref 0–0.04)
IMM GRANULOCYTES NFR BLD AUTO: 0 % (ref 0–0.5)
LYMPHOCYTES # BLD: 2 K/UL (ref 0.8–3.5)
LYMPHOCYTES NFR BLD: 27 % (ref 12–49)
MCH RBC QN AUTO: 30.7 PG (ref 26–34)
MCHC RBC AUTO-ENTMCNC: 33.6 G/DL (ref 30–36.5)
MCV RBC AUTO: 91.3 FL (ref 80–99)
MONOCYTES # BLD: 0.5 K/UL (ref 0–1)
MONOCYTES NFR BLD: 7 % (ref 5–13)
NEUTS SEG # BLD: 4.6 K/UL (ref 1.8–8)
NEUTS SEG NFR BLD: 64 % (ref 32–75)
NRBC # BLD: 0 K/UL (ref 0–0.01)
NRBC BLD-RTO: 0 PER 100 WBC
PLATELET # BLD AUTO: 228 K/UL (ref 150–400)
PMV BLD AUTO: 9.9 FL (ref 8.9–12.9)
POTASSIUM SERPL-SCNC: 3.6 MMOL/L (ref 3.5–5.1)
PROT SERPL-MCNC: 7 G/DL (ref 6.4–8.2)
RBC # BLD AUTO: 4.27 M/UL (ref 4.1–5.7)
SODIUM SERPL-SCNC: 139 MMOL/L (ref 136–145)
WBC # BLD AUTO: 7.3 K/UL (ref 4.1–11.1)

## 2023-12-19 PROCEDURE — 80053 COMPREHEN METABOLIC PANEL: CPT

## 2023-12-19 PROCEDURE — 85025 COMPLETE CBC W/AUTO DIFF WBC: CPT

## 2023-12-19 PROCEDURE — 36415 COLL VENOUS BLD VENIPUNCTURE: CPT

## 2024-01-15 RX ORDER — ELTROMBOPAG OLAMINE 12.5 MG/1
TABLET, FILM COATED ORAL
Qty: 30 TABLET | Refills: 5 | Status: ACTIVE | OUTPATIENT
Start: 2024-01-15

## 2024-01-25 NOTE — PROGRESS NOTES
[facility] [place] at [facility]   [address] [facility], [address] [facility] [address]   W: [phone]  F: [phone]     Reason for Visit:   Naldo Polanco Jr is a 88 y.o. male who is seen for follow up of ITP.     Hematology Oncology Treatment History:       Diagnosis: ITP     Pathology:   2/1/23 Bone marrow biopsy: Normocellular marrow with maturing trilineage hematopoiesis including   mildly left shifted granulopoiesis  without increase in blasts, see comment.   Flow cytometry shows no diagnostic immunophenotypic abnormalities   Peripheral Blood:   Normochromic, normocytic anemia with no increase in schistocytes   Thrombocytopenia. White blood cells normal in number and morphology   CYTOGENETICS - Karyotype:45,X,-Y[16]/46,XY[4]; Interpretation: ABNORMAL MALE KARYOTYPE      Prior Treatment: None     Current Treatment: Promacta 50mg/d started 2/23/23, decreased to Promacta 25mg 3/14/23 and decreased to Promacta 12.5mg on 3/28/23.         History of Present Illness:   Naldo Polanco Jr is a pleasant 88 y.o. male with dementia who is seen for follow up of ITP.   Patient was previously hospitalized at [facility] in 9/2022 for thrombocytopenia and treated with Dex 40 mg/d x 4 days given concern for ITP. HIT/ THERESA at that time were negative. Pt responded well with steroids  with normalization of PLT count. Pt had COVID infection in late Nov 2022, but had only mild symptoms. ITP relapse in early Jan 2023 was treated with Dex 40mg/d x 4 days with short-lived response. Then received 2 days of IVIG on 1/18/23 and 1/19/23.   Pt ambulates without assistance. He lives at [facility]; has been there since Sept 2022.   Has 2 daughters. Barb lives locally and 2nd daughter lives in [place]     Interval History: Patient is seen for relapsed ITP. He continues on Promacta 12.5mg PO daily and has been tolerating well. Denies bruising, bleeding, or petechiae.    No complaints on symptom intake form except

## 2024-01-30 ENCOUNTER — OFFICE VISIT (OUTPATIENT)
Age: 89
End: 2024-01-30
Payer: MEDICARE

## 2024-01-30 ENCOUNTER — HOSPITAL ENCOUNTER (OUTPATIENT)
Facility: HOSPITAL | Age: 89
Setting detail: INFUSION SERIES
Discharge: HOME | End: 2024-01-30
Payer: MEDICARE

## 2024-01-30 VITALS
TEMPERATURE: 97.9 F | BODY MASS INDEX: 27.37 KG/M2 | WEIGHT: 201.8 LBS | HEART RATE: 56 BPM | SYSTOLIC BLOOD PRESSURE: 123 MMHG | OXYGEN SATURATION: 98 % | DIASTOLIC BLOOD PRESSURE: 58 MMHG | RESPIRATION RATE: 16 BRPM

## 2024-01-30 VITALS
DIASTOLIC BLOOD PRESSURE: 58 MMHG | WEIGHT: 201.8 LBS | SYSTOLIC BLOOD PRESSURE: 123 MMHG | RESPIRATION RATE: 16 BRPM | BODY MASS INDEX: 27.33 KG/M2 | TEMPERATURE: 97.9 F | HEIGHT: 72 IN | HEART RATE: 56 BPM | OXYGEN SATURATION: 98 %

## 2024-01-30 DIAGNOSIS — D69.3 IDIOPATHIC THROMBOCYTOPENIC PURPURA (ITP) (HCC): ICD-10-CM

## 2024-01-30 DIAGNOSIS — Z79.899 OTHER LONG TERM (CURRENT) DRUG THERAPY: ICD-10-CM

## 2024-01-30 DIAGNOSIS — D69.3 IDIOPATHIC THROMBOCYTOPENIC PURPURA (ITP) (HCC): Primary | ICD-10-CM

## 2024-01-30 DIAGNOSIS — Z51.81 ENCOUNTER FOR THERAPEUTIC DRUG LEVEL MONITORING: ICD-10-CM

## 2024-01-30 DIAGNOSIS — I10 ESSENTIAL (PRIMARY) HYPERTENSION: ICD-10-CM

## 2024-01-30 DIAGNOSIS — F03.B0 MODERATE DEMENTIA WITHOUT BEHAVIORAL DISTURBANCE, PSYCHOTIC DISTURBANCE, MOOD DISTURBANCE, OR ANXIETY, UNSPECIFIED DEMENTIA TYPE (HCC): ICD-10-CM

## 2024-01-30 LAB
ALBUMIN SERPL-MCNC: 3.3 G/DL (ref 3.5–5)
ALBUMIN/GLOB SERPL: 1 (ref 1.1–2.2)
ALP SERPL-CCNC: 113 U/L (ref 45–117)
ALT SERPL-CCNC: 46 U/L (ref 12–78)
ANION GAP SERPL CALC-SCNC: 5 MMOL/L (ref 5–15)
AST SERPL-CCNC: 27 U/L (ref 15–37)
BASOPHILS # BLD: 0 K/UL (ref 0–0.1)
BASOPHILS NFR BLD: 1 % (ref 0–1)
BILIRUB SERPL-MCNC: 0.6 MG/DL (ref 0.2–1)
BUN SERPL-MCNC: 23 MG/DL (ref 6–20)
BUN/CREAT SERPL: 21 (ref 12–20)
CALCIUM SERPL-MCNC: 8.8 MG/DL (ref 8.5–10.1)
CHLORIDE SERPL-SCNC: 109 MMOL/L (ref 97–108)
CO2 SERPL-SCNC: 29 MMOL/L (ref 21–32)
CREAT SERPL-MCNC: 1.07 MG/DL (ref 0.7–1.3)
DIFFERENTIAL METHOD BLD: ABNORMAL
EOSINOPHIL # BLD: 0.1 K/UL (ref 0–0.4)
EOSINOPHIL NFR BLD: 2 % (ref 0–7)
ERYTHROCYTE [DISTWIDTH] IN BLOOD BY AUTOMATED COUNT: 12.6 % (ref 11.5–14.5)
GLOBULIN SER CALC-MCNC: 3.3 G/DL (ref 2–4)
GLUCOSE SERPL-MCNC: 154 MG/DL (ref 65–100)
HCT VFR BLD AUTO: 36.3 % (ref 36.6–50.3)
HGB BLD-MCNC: 11.6 G/DL (ref 12.1–17)
IMM GRANULOCYTES # BLD AUTO: 0 K/UL (ref 0–0.04)
IMM GRANULOCYTES NFR BLD AUTO: 0 % (ref 0–0.5)
LYMPHOCYTES # BLD: 1.5 K/UL (ref 0.8–3.5)
LYMPHOCYTES NFR BLD: 24 % (ref 12–49)
MCH RBC QN AUTO: 30.6 PG (ref 26–34)
MCHC RBC AUTO-ENTMCNC: 32 G/DL (ref 30–36.5)
MCV RBC AUTO: 95.8 FL (ref 80–99)
MONOCYTES # BLD: 0.5 K/UL (ref 0–1)
MONOCYTES NFR BLD: 8 % (ref 5–13)
NEUTS SEG # BLD: 4.1 K/UL (ref 1.8–8)
NEUTS SEG NFR BLD: 65 % (ref 32–75)
NRBC # BLD: 0 K/UL (ref 0–0.01)
NRBC BLD-RTO: 0 PER 100 WBC
PLATELET # BLD AUTO: 216 K/UL (ref 150–400)
PMV BLD AUTO: 9.9 FL (ref 8.9–12.9)
POTASSIUM SERPL-SCNC: 3.9 MMOL/L (ref 3.5–5.1)
PROT SERPL-MCNC: 6.6 G/DL (ref 6.4–8.2)
RBC # BLD AUTO: 3.79 M/UL (ref 4.1–5.7)
SODIUM SERPL-SCNC: 143 MMOL/L (ref 136–145)
WBC # BLD AUTO: 6.3 K/UL (ref 4.1–11.1)

## 2024-01-30 PROCEDURE — G8419 CALC BMI OUT NRM PARAM NOF/U: HCPCS | Performed by: INTERNAL MEDICINE

## 2024-01-30 PROCEDURE — 36415 COLL VENOUS BLD VENIPUNCTURE: CPT

## 2024-01-30 PROCEDURE — 85025 COMPLETE CBC W/AUTO DIFF WBC: CPT

## 2024-01-30 PROCEDURE — 99214 OFFICE O/P EST MOD 30 MIN: CPT | Performed by: INTERNAL MEDICINE

## 2024-01-30 PROCEDURE — G8427 DOCREV CUR MEDS BY ELIG CLIN: HCPCS | Performed by: INTERNAL MEDICINE

## 2024-01-30 PROCEDURE — 1036F TOBACCO NON-USER: CPT | Performed by: INTERNAL MEDICINE

## 2024-01-30 PROCEDURE — G8484 FLU IMMUNIZE NO ADMIN: HCPCS | Performed by: INTERNAL MEDICINE

## 2024-01-30 PROCEDURE — 80053 COMPREHEN METABOLIC PANEL: CPT

## 2024-01-30 PROCEDURE — 1123F ACP DISCUSS/DSCN MKR DOCD: CPT | Performed by: INTERNAL MEDICINE

## 2024-01-30 NOTE — PROGRESS NOTES
Chief Complaint   Patient presents with    Follow-up           Vitals:    01/30/24 1008   BP: (!) 123/58   Pulse: 56   Resp: 16   Temp: 97.9 °F (36.6 °C)   SpO2: 98%            1. Have you been to the ER, urgent care clinic since your last visit?  Hospitalized since your last visit?  No  2. Have you seen or consulted any other health care providers outside of the [facility] since your last visit?  Include any pap smears or colon screening. No

## 2024-02-21 ENCOUNTER — APPOINTMENT (OUTPATIENT)
Facility: HOSPITAL | Age: 89
DRG: 243 | End: 2024-02-21
Payer: MEDICARE

## 2024-02-21 ENCOUNTER — HOSPITAL ENCOUNTER (EMERGENCY)
Facility: HOSPITAL | Age: 89
Discharge: HOME | DRG: 243 | End: 2024-02-21
Attending: EMERGENCY MEDICINE
Payer: MEDICARE

## 2024-02-21 ENCOUNTER — HOSPITAL ENCOUNTER (INPATIENT)
Facility: HOSPITAL | Age: 89
LOS: 1 days | Discharge: HOME | DRG: 243 | End: 2024-02-22
Attending: STUDENT IN AN ORGANIZED HEALTH CARE EDUCATION/TRAINING PROGRAM | Admitting: STUDENT IN AN ORGANIZED HEALTH CARE EDUCATION/TRAINING PROGRAM
Payer: MEDICARE

## 2024-02-21 VITALS
RESPIRATION RATE: 18 BRPM | SYSTOLIC BLOOD PRESSURE: 137 MMHG | OXYGEN SATURATION: 92 % | HEART RATE: 45 BPM | TEMPERATURE: 99.3 F | WEIGHT: 207 LBS | HEIGHT: 72 IN | DIASTOLIC BLOOD PRESSURE: 51 MMHG | BODY MASS INDEX: 28.04 KG/M2

## 2024-02-21 DIAGNOSIS — S09.90XD INJURY OF HEAD, SUBSEQUENT ENCOUNTER: Primary | ICD-10-CM

## 2024-02-21 DIAGNOSIS — R55 SYNCOPE AND COLLAPSE: ICD-10-CM

## 2024-02-21 DIAGNOSIS — R00.1 SEVERE SINUS BRADYCARDIA: ICD-10-CM

## 2024-02-21 DIAGNOSIS — T14.8XXA MULTIPLE SKIN TEARS: ICD-10-CM

## 2024-02-21 DIAGNOSIS — R09.02 HYPOXIA: ICD-10-CM

## 2024-02-21 DIAGNOSIS — I44.1 SECOND DEGREE AV BLOCK, MOBITZ TYPE II: ICD-10-CM
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
## 2024-02-21 DIAGNOSIS — S43.004A DISLOCATION OF RIGHT SHOULDER JOINT, INITIAL ENCOUNTER: Primary | ICD-10-CM

## 2024-02-21 DIAGNOSIS — R00.1 BRADYCARDIA: ICD-10-CM

## 2024-02-21 LAB
ALBUMIN SERPL-MCNC: 3.3 G/DL (ref 3.5–5)
ALBUMIN/GLOB SERPL: 1 (ref 1.1–2.2)
ALP SERPL-CCNC: 91 U/L (ref 45–117)
ALT SERPL-CCNC: 27 U/L (ref 12–78)
ANION GAP SERPL CALC-SCNC: 5 MMOL/L (ref 5–15)
AST SERPL-CCNC: 36 U/L (ref 15–37)
BASOPHILS # BLD: 0 K/UL (ref 0–0.1)
BASOPHILS NFR BLD: 0 % (ref 0–1)
BILIRUB SERPL-MCNC: 0.5 MG/DL (ref 0.2–1)
BUN SERPL-MCNC: 34 MG/DL (ref 6–20)
BUN/CREAT SERPL: 26 (ref 12–20)
CALCIUM SERPL-MCNC: 8.8 MG/DL (ref 8.5–10.1)
CHLORIDE SERPL-SCNC: 114 MMOL/L (ref 97–108)
CO2 SERPL-SCNC: 27 MMOL/L (ref 21–32)
COMMENT:: NORMAL
CREAT SERPL-MCNC: 1.31 MG/DL (ref 0.7–1.3)
DIFFERENTIAL METHOD BLD: ABNORMAL
ECHO BSA: 2.15 M2
EKG ATRIAL RATE: 41 BPM
EKG ATRIAL RATE: 46 BPM
EKG DIAGNOSIS: NORMAL
EKG DIAGNOSIS: NORMAL
EKG P AXIS: 76 DEGREES
EKG P AXIS: 79 DEGREES
EKG P-R INTERVAL: 252 MS
EKG P-R INTERVAL: 256 MS
EKG Q-T INTERVAL: 550 MS
EKG Q-T INTERVAL: 584 MS
EKG QRS DURATION: 128 MS
EKG QRS DURATION: 142 MS
EKG QTC CALCULATION (BAZETT): 481 MS
EKG QTC CALCULATION (BAZETT): 481 MS
EKG R AXIS: -16 DEGREES
EKG R AXIS: -34 DEGREES
EKG T AXIS: 102 DEGREES
EKG T AXIS: 166 DEGREES
EKG VENTRICULAR RATE: 41 BPM
EKG VENTRICULAR RATE: 46 BPM
EOSINOPHIL # BLD: 0 K/UL (ref 0–0.4)
EOSINOPHIL NFR BLD: 0 % (ref 0–7)
ERYTHROCYTE [DISTWIDTH] IN BLOOD BY AUTOMATED COUNT: 12.6 % (ref 11.5–14.5)
GLOBULIN SER CALC-MCNC: 3.2 G/DL (ref 2–4)
GLUCOSE SERPL-MCNC: 142 MG/DL (ref 65–100)
HCT VFR BLD AUTO: 37 % (ref 36.6–50.3)
HGB BLD-MCNC: 11.8 G/DL (ref 12.1–17)
IMM GRANULOCYTES # BLD AUTO: 0.1 K/UL (ref 0–0.04)
IMM GRANULOCYTES NFR BLD AUTO: 1 % (ref 0–0.5)
LYMPHOCYTES # BLD: 1.1 K/UL (ref 0.8–3.5)
LYMPHOCYTES NFR BLD: 8 % (ref 12–49)
MCH RBC QN AUTO: 30.3 PG (ref 26–34)
MCHC RBC AUTO-ENTMCNC: 31.9 G/DL (ref 30–36.5)
MCV RBC AUTO: 95.1 FL (ref 80–99)
MONOCYTES # BLD: 1.5 K/UL (ref 0–1)
MONOCYTES NFR BLD: 11 % (ref 5–13)
NEUTS SEG # BLD: 10.9 K/UL (ref 1.8–8)
NEUTS SEG NFR BLD: 80 % (ref 32–75)
NRBC # BLD: 0 K/UL (ref 0–0.01)
NRBC BLD-RTO: 0 PER 100 WBC
NT PRO BNP: 3540 PG/ML
PLATELET # BLD AUTO: 243 K/UL (ref 150–400)
PMV BLD AUTO: 10.5 FL (ref 8.9–12.9)
POTASSIUM SERPL-SCNC: 3.7 MMOL/L (ref 3.5–5.1)
PROT SERPL-MCNC: 6.5 G/DL (ref 6.4–8.2)
RBC # BLD AUTO: 3.89 M/UL (ref 4.1–5.7)
SODIUM SERPL-SCNC: 146 MMOL/L (ref 136–145)
SPECIMEN HOLD: NORMAL
TROPONIN I SERPL HS-MCNC: 589 NG/L (ref 0–76)
TSH SERPL DL<=0.05 MIU/L-ACNC: 0.67 UIU/ML (ref 0.36–3.74)
WBC # BLD AUTO: 13.7 K/UL (ref 4.1–11.1)

## 2024-02-21 PROCEDURE — 99152 MOD SED SAME PHYS/QHP 5/>YRS: CPT | Performed by: INTERNAL MEDICINE

## 2024-02-21 PROCEDURE — 72125 CT NECK SPINE W/O DYE: CPT

## 2024-02-21 PROCEDURE — 2500000003 HC RX 250 WO HCPCS: Performed by: INTERNAL MEDICINE

## 2024-02-21 PROCEDURE — 02H63JZ INSERTION OF PACEMAKER LEAD INTO RIGHT ATRIUM, PERCUTANEOUS APPROACH: ICD-10-PCS | Performed by: INTERNAL MEDICINE

## 2024-02-21 PROCEDURE — C1785 PMKR, DUAL, RATE-RESP: HCPCS | Performed by: INTERNAL MEDICINE

## 2024-02-21 PROCEDURE — 94761 N-INVAS EAR/PLS OXIMETRY MLT: CPT

## 2024-02-21 PROCEDURE — 33208 INSRT HEART PM ATRIAL & VENT: CPT | Performed by: INTERNAL MEDICINE

## 2024-02-21 PROCEDURE — 84443 ASSAY THYROID STIM HORMONE: CPT

## 2024-02-21 PROCEDURE — C1769 GUIDE WIRE: HCPCS | Performed by: INTERNAL MEDICINE

## 2024-02-21 PROCEDURE — C1892 INTRO/SHEATH,FIXED,PEEL-AWAY: HCPCS | Performed by: INTERNAL MEDICINE

## 2024-02-21 PROCEDURE — C1894 INTRO/SHEATH, NON-LASER: HCPCS | Performed by: INTERNAL MEDICINE

## 2024-02-21 PROCEDURE — 36415 COLL VENOUS BLD VENIPUNCTURE: CPT

## 2024-02-21 PROCEDURE — 93010 ELECTROCARDIOGRAM REPORT: CPT | Performed by: INTERNAL MEDICINE

## 2024-02-21 PROCEDURE — 73060 X-RAY EXAM OF HUMERUS: CPT

## 2024-02-21 PROCEDURE — 80053 COMPREHEN METABOLIC PANEL: CPT

## 2024-02-21 PROCEDURE — 2500000003 HC RX 250 WO HCPCS: Performed by: EMERGENCY MEDICINE

## 2024-02-21 PROCEDURE — 93005 ELECTROCARDIOGRAM TRACING: CPT | Performed by: STUDENT IN AN ORGANIZED HEALTH CARE EDUCATION/TRAINING PROGRAM

## 2024-02-21 PROCEDURE — 99285 EMERGENCY DEPT VISIT HI MDM: CPT

## 2024-02-21 PROCEDURE — 71045 X-RAY EXAM CHEST 1 VIEW: CPT

## 2024-02-21 PROCEDURE — 99223 1ST HOSP IP/OBS HIGH 75: CPT | Performed by: INTERNAL MEDICINE

## 2024-02-21 PROCEDURE — 2709999900 HC NON-CHARGEABLE SUPPLY: Performed by: INTERNAL MEDICINE

## 2024-02-21 PROCEDURE — 6370000000 HC RX 637 (ALT 250 FOR IP): Performed by: INTERNAL MEDICINE

## 2024-02-21 PROCEDURE — 70450 CT HEAD/BRAIN W/O DYE: CPT

## 2024-02-21 PROCEDURE — 2060000000 HC ICU INTERMEDIATE R&B

## 2024-02-21 PROCEDURE — 6370000000 HC RX 637 (ALT 250 FOR IP): Performed by: STUDENT IN AN ORGANIZED HEALTH CARE EDUCATION/TRAINING PROGRAM

## 2024-02-21 PROCEDURE — 85025 COMPLETE CBC W/AUTO DIFF WBC: CPT

## 2024-02-21 PROCEDURE — 83880 ASSAY OF NATRIURETIC PEPTIDE: CPT

## 2024-02-21 PROCEDURE — 2580000003 HC RX 258: Performed by: INTERNAL MEDICINE

## 2024-02-21 PROCEDURE — 93005 ELECTROCARDIOGRAM TRACING: CPT | Performed by: EMERGENCY MEDICINE

## 2024-02-21 PROCEDURE — 02HK3JZ INSERTION OF PACEMAKER LEAD INTO RIGHT VENTRICLE, PERCUTANEOUS APPROACH: ICD-10-PCS | Performed by: INTERNAL MEDICINE

## 2024-02-21 PROCEDURE — 84484 ASSAY OF TROPONIN QUANT: CPT

## 2024-02-21 PROCEDURE — 6360000002 HC RX W HCPCS: Performed by: INTERNAL MEDICINE

## 2024-02-21 PROCEDURE — C1898 LEAD, PMKR, OTHER THAN TRANS: HCPCS | Performed by: INTERNAL MEDICINE

## 2024-02-21 PROCEDURE — 73070 X-RAY EXAM OF ELBOW: CPT

## 2024-02-21 PROCEDURE — 73030 X-RAY EXAM OF SHOULDER: CPT

## 2024-02-21 PROCEDURE — 76937 US GUIDE VASCULAR ACCESS: CPT | Performed by: INTERNAL MEDICINE

## 2024-02-21 PROCEDURE — APPSS30 APP SPLIT SHARED TIME 16-30 MINUTES: Performed by: NURSE PRACTITIONER

## 2024-02-21 PROCEDURE — 0JH606Z INSERTION OF PACEMAKER, DUAL CHAMBER INTO CHEST SUBCUTANEOUS TISSUE AND FASCIA, OPEN APPROACH: ICD-10-PCS | Performed by: INTERNAL MEDICINE

## 2024-02-21 PROCEDURE — 99153 MOD SED SAME PHYS/QHP EA: CPT | Performed by: INTERNAL MEDICINE

## 2024-02-21 DEVICE — IPG W1DR01 AZURE XT DR MRI USA
Type: IMPLANTABLE DEVICE | Status: FUNCTIONAL
Brand: AZURE™ XT DR MRI SURESCAN™

## 2024-02-21 DEVICE — LEAD 5076-58 MRI US RCMCRD
Type: IMPLANTABLE DEVICE | Status: FUNCTIONAL
Brand: CAPSUREFIX NOVUS MRI™ SURESCAN®

## 2024-02-21 DEVICE — LEAD 5076-52 MRI US RCMCRD
Type: IMPLANTABLE DEVICE | Status: FUNCTIONAL
Brand: CAPSUREFIX NOVUS MRI™ SURESCAN®

## 2024-02-21 RX ORDER — HALOPERIDOL 5 MG/ML
INJECTION INTRAMUSCULAR PRN
Status: DISCONTINUED | OUTPATIENT
Start: 2024-02-21 | End: 2024-02-21 | Stop reason: HOSPADM

## 2024-02-21 RX ORDER — SODIUM CHLORIDE 0.9 % (FLUSH) 0.9 %
5-40 SYRINGE (ML) INJECTION PRN
Status: DISCONTINUED | OUTPATIENT
Start: 2024-02-21 | End: 2024-02-22 | Stop reason: HOSPADM

## 2024-02-21 RX ORDER — ONDANSETRON 2 MG/ML
4 INJECTION INTRAMUSCULAR; INTRAVENOUS EVERY 6 HOURS PRN
Status: DISCONTINUED | OUTPATIENT
Start: 2024-02-21 | End: 2024-02-22 | Stop reason: HOSPADM

## 2024-02-21 RX ORDER — ACETAMINOPHEN 650 MG/1
650 SUPPOSITORY RECTAL EVERY 6 HOURS PRN
Status: DISCONTINUED | OUTPATIENT
Start: 2024-02-21 | End: 2024-02-22 | Stop reason: HOSPADM

## 2024-02-21 RX ORDER — GINSENG 100 MG
CAPSULE ORAL
Status: DISCONTINUED | OUTPATIENT
Start: 2024-02-21 | End: 2024-02-21 | Stop reason: HOSPADM

## 2024-02-21 RX ORDER — DONEPEZIL HYDROCHLORIDE 5 MG/1
10 TABLET, FILM COATED ORAL NIGHTLY
Status: DISCONTINUED | OUTPATIENT
Start: 2024-02-21 | End: 2024-02-22 | Stop reason: HOSPADM

## 2024-02-21 RX ORDER — FENTANYL CITRATE 50 UG/ML
INJECTION, SOLUTION INTRAMUSCULAR; INTRAVENOUS PRN
Status: DISCONTINUED | OUTPATIENT
Start: 2024-02-21 | End: 2024-02-21 | Stop reason: HOSPADM

## 2024-02-21 RX ORDER — SODIUM CHLORIDE 9 MG/ML
INJECTION, SOLUTION INTRAVENOUS PRN
Status: DISCONTINUED | OUTPATIENT
Start: 2024-02-21 | End: 2024-02-22 | Stop reason: HOSPADM

## 2024-02-21 RX ORDER — SENNOSIDES 8.6 MG
650 CAPSULE ORAL EVERY 8 HOURS PRN
Qty: 30 TABLET | Refills: 0 | Status: SHIPPED | OUTPATIENT
Start: 2024-02-21 | End: 2024-02-21

## 2024-02-21 RX ORDER — CEPHALEXIN 250 MG/1
500 CAPSULE ORAL EVERY 8 HOURS
Status: DISCONTINUED | OUTPATIENT
Start: 2024-02-21 | End: 2024-02-22 | Stop reason: HOSPADM

## 2024-02-21 RX ORDER — SODIUM CHLORIDE 0.9 % (FLUSH) 0.9 %
5-40 SYRINGE (ML) INJECTION EVERY 12 HOURS SCHEDULED
Status: DISCONTINUED | OUTPATIENT
Start: 2024-02-21 | End: 2024-02-22 | Stop reason: HOSPADM

## 2024-02-21 RX ORDER — FINASTERIDE 5 MG/1
5 TABLET, FILM COATED ORAL DAILY
Status: DISCONTINUED | OUTPATIENT
Start: 2024-02-22 | End: 2024-02-22 | Stop reason: HOSPADM

## 2024-02-21 RX ORDER — LIDOCAINE HYDROCHLORIDE 20 MG/ML
20 INJECTION, SOLUTION INFILTRATION; PERINEURAL ONCE
Status: COMPLETED | OUTPATIENT
Start: 2024-02-21 | End: 2024-02-21

## 2024-02-21 RX ORDER — DONEPEZIL HYDROCHLORIDE 5 MG/1
5 TABLET, FILM COATED ORAL NIGHTLY
Status: DISCONTINUED | OUTPATIENT
Start: 2024-02-21 | End: 2024-02-21

## 2024-02-21 RX ORDER — POTASSIUM CHLORIDE 7.45 MG/ML
10 INJECTION INTRAVENOUS PRN
Status: DISCONTINUED | OUTPATIENT
Start: 2024-02-21 | End: 2024-02-22 | Stop reason: HOSPADM

## 2024-02-21 RX ORDER — ONDANSETRON 2 MG/ML
4 INJECTION INTRAMUSCULAR; INTRAVENOUS EVERY 6 HOURS PRN
Status: DISCONTINUED | OUTPATIENT
Start: 2024-02-21 | End: 2024-02-21

## 2024-02-21 RX ORDER — OXYCODONE HYDROCHLORIDE 5 MG/1
5 TABLET ORAL EVERY 4 HOURS PRN
Status: DISCONTINUED | OUTPATIENT
Start: 2024-02-21 | End: 2024-02-22 | Stop reason: HOSPADM

## 2024-02-21 RX ORDER — ATORVASTATIN CALCIUM 10 MG/1
10 TABLET, FILM COATED ORAL NIGHTLY
Status: DISCONTINUED | OUTPATIENT
Start: 2024-02-21 | End: 2024-02-22 | Stop reason: HOSPADM

## 2024-02-21 RX ORDER — ACETAMINOPHEN 325 MG/1
650 TABLET ORAL EVERY 6 HOURS PRN
Status: DISCONTINUED | OUTPATIENT
Start: 2024-02-21 | End: 2024-02-22 | Stop reason: HOSPADM

## 2024-02-21 RX ORDER — DIPHENHYDRAMINE HYDROCHLORIDE 50 MG/ML
INJECTION INTRAMUSCULAR; INTRAVENOUS PRN
Status: DISCONTINUED | OUTPATIENT
Start: 2024-02-21 | End: 2024-02-21 | Stop reason: HOSPADM

## 2024-02-21 RX ORDER — ACETAMINOPHEN 325 MG/1
650 TABLET ORAL EVERY 6 HOURS PRN
COMMUNITY

## 2024-02-21 RX ORDER — AMLODIPINE BESYLATE 10 MG/1
10 TABLET ORAL DAILY
Status: ON HOLD | COMMUNITY
End: 2024-02-22 | Stop reason: HOSPADM

## 2024-02-21 RX ORDER — OXYCODONE HYDROCHLORIDE 5 MG/1
10 TABLET ORAL EVERY 4 HOURS PRN
Status: DISCONTINUED | OUTPATIENT
Start: 2024-02-21 | End: 2024-02-22 | Stop reason: HOSPADM

## 2024-02-21 RX ORDER — AMMONIUM LACTATE 12 G/100G
CREAM TOPICAL DAILY
COMMUNITY

## 2024-02-21 RX ORDER — MAGNESIUM SULFATE IN WATER 40 MG/ML
2000 INJECTION, SOLUTION INTRAVENOUS PRN
Status: DISCONTINUED | OUTPATIENT
Start: 2024-02-21 | End: 2024-02-22 | Stop reason: HOSPADM

## 2024-02-21 RX ORDER — QUETIAPINE FUMARATE 100 MG/1
50 TABLET, FILM COATED ORAL 2 TIMES DAILY
Status: DISCONTINUED | OUTPATIENT
Start: 2024-02-21 | End: 2024-02-22 | Stop reason: HOSPADM

## 2024-02-21 RX ORDER — ONDANSETRON 4 MG/1
4 TABLET, ORALLY DISINTEGRATING ORAL EVERY 8 HOURS PRN
Status: DISCONTINUED | OUTPATIENT
Start: 2024-02-21 | End: 2024-02-21

## 2024-02-21 RX ORDER — LIDOCAINE HYDROCHLORIDE AND EPINEPHRINE BITARTRATE 20; .01 MG/ML; MG/ML
INJECTION, SOLUTION SUBCUTANEOUS PRN
Status: DISCONTINUED | OUTPATIENT
Start: 2024-02-21 | End: 2024-02-21 | Stop reason: HOSPADM

## 2024-02-21 RX ORDER — CEFAZOLIN SODIUM 1 G/3ML
INJECTION, POWDER, FOR SOLUTION INTRAMUSCULAR; INTRAVENOUS PRN
Status: DISCONTINUED | OUTPATIENT
Start: 2024-02-21 | End: 2024-02-21 | Stop reason: HOSPADM

## 2024-02-21 RX ORDER — POLYETHYLENE GLYCOL 3350 17 G/17G
17 POWDER, FOR SOLUTION ORAL DAILY PRN
Status: DISCONTINUED | OUTPATIENT
Start: 2024-02-21 | End: 2024-02-22 | Stop reason: HOSPADM

## 2024-02-21 RX ORDER — ACETAMINOPHEN 325 MG/1
650 TABLET ORAL EVERY 4 HOURS PRN
Status: DISCONTINUED | OUTPATIENT
Start: 2024-02-21 | End: 2024-02-22 | Stop reason: HOSPADM

## 2024-02-21 RX ORDER — POTASSIUM CHLORIDE 750 MG/1
40 TABLET, FILM COATED, EXTENDED RELEASE ORAL PRN
Status: DISCONTINUED | OUTPATIENT
Start: 2024-02-21 | End: 2024-02-22 | Stop reason: HOSPADM

## 2024-02-21 RX ADMIN — QUETIAPINE FUMARATE 50 MG: 100 TABLET ORAL at 20:59

## 2024-02-21 RX ADMIN — DONEPEZIL HYDROCHLORIDE 10 MG: 5 TABLET, FILM COATED ORAL at 21:00

## 2024-02-21 RX ADMIN — LIDOCAINE HYDROCHLORIDE 20 ML: 20 INJECTION, SOLUTION INFILTRATION; PERINEURAL at 05:24

## 2024-02-21 RX ADMIN — CEPHALEXIN 500 MG: 250 CAPSULE ORAL at 21:00

## 2024-02-21 RX ADMIN — SODIUM CHLORIDE, PRESERVATIVE FREE 10 ML: 5 INJECTION INTRAVENOUS at 21:01

## 2024-02-21 RX ADMIN — ATORVASTATIN CALCIUM 10 MG: 10 TABLET, FILM COATED ORAL at 20:59

## 2024-02-21 ASSESSMENT — LIFESTYLE VARIABLES
HOW MANY STANDARD DRINKS CONTAINING ALCOHOL DO YOU HAVE ON A TYPICAL DAY: PATIENT DOES NOT DRINK
HOW OFTEN DO YOU HAVE A DRINK CONTAINING ALCOHOL: NEVER

## 2024-02-21 ASSESSMENT — PAIN SCALES - WONG BAKER: WONGBAKER_NUMERICALRESPONSE: 4

## 2024-02-21 NOTE — CONSULTS
Attending Cardiologist Note:    Pt personally seen and examined. Chart reviewed.    Agree with advanced NP's history, exam and  A/P with changes/additons.    Patient is 88-year-old male with history of hypertension, dementia, BPH, ITP admitted for recurrent falls.  Has been in the ED for recurrent falls.   Has had elevated troponins on prior hospital visit.  Patient noted to be bradycardic on admission with 2 is to 1 block.  EKG consistent with sinus rhythm, 2 is to 1 block, left bundle branch block.  Patient is a poor historian.  Denies chest pain.    Blood pressure (!) 141/68, pulse 71, temperature 97.7 °F (36.5 °C), temperature source Oral, resp. rate 17, height 1.829 m (6'), weight 90.7 kg (199 lb 15.3 oz), SpO2 97 %.    NAD  Alert  Neck-no JVD  CVS-S1-S2 present,  2/6 systolic murmur present  RS-   CTAB        Abdomen-soft/NT  LE-   no edema    A/P :    Second-degree AV block/history of sinus bradycardia  History of recurrent falls  Hypertension  Elevated troponin  Dementia  BPH  ITP    Plan:  Echocardiogram  Troponin elevation-probably non-ACS related patient has history of dementia.  Probably best to medically manage unless family wants aggressive management.  Patient is a POA.  Has had elevated troponins on previous admission..  EP consult-Dr. Gaspar to insert PPM today      Discussed with patient/nursing    Matt Hollis MD, FACC        [facility] CARDIOLOGY                    Cardiology Care Note     [x]Initial Encounter     []Follow-up    Patient Name: Naldo Polanco Jr - :1935 - MRN:[id number]  Primary Cardiologist: Dr Huffman  consult  Consulting Cardiologist: Matt Fish MD     Reason for encounter: syncope/falls    HPI:       Naldo Polanco Jr is a 88 y.o. male with PMH significant for HTN, dementia, BPH, ITP admitted for recurrent falls. Was seen in ER and discharged after a fall at his facility. He then returned to the facility and fell again .    Initial fall was not  pulmonary  vasculature is within normal limits.    The lungs and pleural spaces are clear. The bones are osteopenic. Multilevel  spondylosis in the spine.    Impression  No acute process on portable chest.       CT Result (most recent):  CT HEAD WO CONTRAST 02/21/2024    Narrative  EXAM: CT HEAD WO CONTRAST    INDICATION: GLF with head injury    COMPARISON: CT performed 6 hours prior.    CONTRAST: None.    TECHNIQUE: Unenhanced CT of the head was performed using 5 mm images. Brain and  bone windows were generated. Coronal and sagittal reformats. CT dose reduction  was achieved through use of a standardized protocol tailored for this  examination and automatic exposure control for dose modulation.    FINDINGS:  The bone windows demonstrate no abnormalities. The visualized portions of the  paranasal sinuses and mastoid air cells are clear. Tiny right frontal scalp  hematoma. Subcortical and periventricular white matter hypodensities likely  reflect chronic microangiopathic changes.    The ventricles and sulci are age-appropriate and symmetric. . The basilar  cisterns are open.  There is no intracranial hemorrhage, extra-axial collection,  or mass effect.  No CT evidence of acute infarct. Subcortical and  periventricular white matter hypodensities likely reflect chronic  microangiopathic changes.    Impression  1.  No acute intracranial findings.  2.  Tiny right frontal scalp hematoma.      Lab Results   Component Value Date    WBC 13.7 (H) 02/21/2024    HGB 11.8 (L) 02/21/2024    HCT 37.0 02/21/2024    MCV 95.1 02/21/2024     02/21/2024       No results for input(s): \"CHOL\", \"HDLC\", \"LDLC\", \"HBA1C\" in the last 72 hours.    Invalid input(s): \"TGL\"    Lab Results   Component Value Date/Time     02/21/2024 10:10 AM    K 3.7 02/21/2024 10:10 AM     02/21/2024 10:10 AM    CO2 27 02/21/2024 10:10 AM    BUN 34 02/21/2024 10:10 AM    CREATININE 1.31 02/21/2024 10:10 AM    GLUCOSE 142 02/21/2024 10:10 AM

## 2024-02-21 NOTE — PROCEDURES
Pacemaker Implantation    Procedure Date: 2/21/2024  Lab Physician: Swetha Gaspar MD, [facility], [facility], [place]    INDICATIONS:  89 yo M with a history of dementia, HTN presenting with recurrent falls/syncope with multiple ER presentations found to have 2:1 AV block. Not on any matt blocking agents, now referred for dual chamber PPM implantation.     COMMENTS:  After informed consent was obtained, the patient was brought to the electrophysiology laboratory in the fasting state, and was prepped and draped in the usual sterile fashion. IV antibiotic was administered prophylactically. Conscious sedation was administered by nursing staff independent of those performing the procedure under my supervision with intermittent dosing of anxiolytics and narcotics for a total sedation time of 30 mins.    Ultrasound-guided Access  Local anesthetic was delivered to the left pectoral region and an incision was made in the left deltopectoral groove. The axillary vein was accessed using a micropuncture needle with ultrasound guidance (ultrasound evaluation of possible access sites. Patency of the selected vessel.  Realtime visualization of the vascular needle entry was performed) and the vein was cannulated and a retaining wire was placed in the IVC under fluoroscopy. A 7F peel-away sheath was placed in the vein and a Medtronic lead was advanced to the RV apex under fluoroscopic guidance. Ventricular sensing and pacing thresholds were checked and were good.    A 7F peel away sheath was then placed in the vein along with the ventricular lead over the retained glide wire, and a Medtronic lead was placed in the RAA under fluoroscopic guidance. Atrial pacing and sensing thresholds were checked and were good. Pacing at 10V was performed from the ventricular lead without diaphragmatic or intercostal capture. The leads were sutured to the underlying pectoralis muscle using 0 Silk suture. Final pacing and sensing thresholds were checked and were good.      Wound Closure  A subcutaneous pocket was then created using blunt dissection and it was copiously irrigated with a saline solution containing antibiotics. The leads were connected to a Medtronic generator and the entire system was implanted into the pocket. The subcutaneous tissues were then closed with 2 continuous layers of 2-0 Stratafix sutures.  The skin was closed with a Dermabond layer. The wound was covered with a dry sterile dressing.      The patient tolerated the procedure well and left the laboratory in good condition. Conscious sedation was provided and appropriate monitoring performed by members of the EP nursing staff.    CONCLUSIONS:  Successful implantation of a Medtronic dual chamber Pacemaker.    RECOMMENDATIONS:  1. CXR to assess for leads placement and rule out complications  2. F/U device clinic/wound check in 10-14 days  3. EP clinic follow-up in 4 months.

## 2024-02-21 NOTE — H&P
History and Physical    Date of Service:  2/21/2024  Primary Care Provider: Yang Hernandez MD  Source of information: chart review     Chief Complaint: Fall      History of Presenting Illness:   Naldo Polanco Jr is a 88 y.o. 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     Patient has dementia and a poor historian. Most of the history is obtained by chart review. Patient lives at [facility]. Patient had a fall this AM  and presented to [facility] ER and was noted to have right shoulder dislocation. He underwent shoulder reduction at bedside by ER physician under sedation and discharged to [facility]. He attempted to ambulate at facility, experienced generalized weakness and had another fall striking his head and brought back to ER.          REVIEW OF SYSTEMS:       I am not able to complete the review of systems because:   The patient is intubated and sedated    The patient has altered mental status due to his acute medical problems    The patient has baseline aphasia from prior stroke(s)   x The patient has baseline dementia and is not reliable historian    The patient is in acute medical distress and unable to provide information           Total of 12 systems reviewed as follows:       POSITIVE= underlined text  Negative = text not underlined  General:  fever, chills, sweats, generalized weakness, weight loss/gain,      loss of appetite   Eyes:    blurred vision, eye pain, loss of vision, double vision  ENT:    rhinorrhea, pharyngitis   Respiratory:   cough, sputum production, SOB, MOCTEZUMA, wheezing, pleuritic pain   Cardiology:   chest pain, palpitations, orthopnea, PND, edema, syncope   Gastrointestinal:  abdominal pain , N/V, diarrhea, dysphagia, constipation, bleeding   Genitourinary:  frequency, urgency, dysuria, hematuria, incontinence   Muskuloskeletal :  arthralgia, myalgia, back pain  Hematology:  easy bruising, nose or gum bleeding,  lymphadenopathy   Dermatological: rash, ulceration, pruritis, color change / jaundice  Endocrine:   hot flashes or polydipsia   Neurological:  headache, dizziness, confusion, focal weakness, paresthesia,     Speech difficulties, memory loss, gait difficulty  Psychological: Feelings of anxiety, depression, agitation        Past Medical History:   Diagnosis Date    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 Authorizing Provider   acetaminophen (TYLENOL) 650 MG extended release tablet Take 1 tablet by mouth every 8 hours as needed for Pain 2/21/24   Gerardo Sam MD   PROMACTA 12.5 MG TABS tablet TAKE 1 TABLET DAILY BEFORE BREAKFAST 1/15/24   Nery Lockhart, APRN - NP   atorvastatin (LIPITOR) 40 MG tablet 10 mg    Automatic Reconciliation, Ar   donepezil (ARICEPT) 5 MG tablet Take 1 tablet by mouth nightly 1/6/23   Automatic Reconciliation, Ar   finasteride (PROSCAR) 5 MG tablet Take 1 tablet by mouth daily 7/14/21   Automatic Reconciliation, Ar   QUEtiapine (SEROQUEL) 50 MG tablet Take 1 tablet by mouth 2 times daily 1/6/23   Automatic Reconciliation, Ar     No Known Allergies   No family history on file.   Social History:  reports that he has never smoked. He has never used smokeless tobacco. He reports that he does not currently use alcohol. He reports that he does not use drugs.   Social Determinants of Health     Tobacco Use: Low Risk  (1/30/2024)    Patient History     Smoking Tobacco Use: Never     Smokeless Tobacco Use: Never     Passive Exposure: Not on file   Alcohol Use: Not

## 2024-02-21 NOTE — CONSULTS
versus aspiration.    COMPARISON: none    TECHNIQUE: portable chest AP view    FINDINGS: The cardiac silhouette is within normal limits. The pulmonary  vasculature is within normal limits.    The lungs and pleural spaces are clear. The bones are osteopenic. Multilevel  spondylosis in the spine.    Impression  No acute process on portable chest.       CT Result (most recent):  CT HEAD WO CONTRAST 02/21/2024    Narrative  EXAM: CT HEAD WO CONTRAST    INDICATION: GLF with head injury    COMPARISON: CT performed 6 hours prior.    CONTRAST: None.    TECHNIQUE: Unenhanced CT of the head was performed using 5 mm images. Brain and  bone windows were generated. Coronal and sagittal reformats. CT dose reduction  was achieved through use of a standardized protocol tailored for this  examination and automatic exposure control for dose modulation.    FINDINGS:  The bone windows demonstrate no abnormalities. The visualized portions of the  paranasal sinuses and mastoid air cells are clear. Tiny right frontal scalp  hematoma. Subcortical and periventricular white matter hypodensities likely  reflect chronic microangiopathic changes.    The ventricles and sulci are age-appropriate and symmetric. . The basilar  cisterns are open.  There is no intracranial hemorrhage, extra-axial collection,  or mass effect.  No CT evidence of acute infarct. Subcortical and  periventricular white matter hypodensities likely reflect chronic  microangiopathic changes.    Impression  1.  No acute intracranial findings.  2.  Tiny right frontal scalp hematoma.      Lab Results   Component Value Date    WBC 13.7 (H) 02/21/2024    HGB 11.8 (L) 02/21/2024    HCT 37.0 02/21/2024    MCV 95.1 02/21/2024     02/21/2024       No results for input(s): \"CHOL\", \"HDLC\", \"LDLC\", \"HBA1C\" in the last 72 hours.    Invalid input(s): \"TGL\"    Lab Results   Component Value Date/Time     02/21/2024 10:10 AM    K 3.7 02/21/2024 10:10 AM     02/21/2024 10:10  AM    CO2 27 02/21/2024 10:10 AM    BUN 34 02/21/2024 10:10 AM    CREATININE 1.31 02/21/2024 10:10 AM    GLUCOSE 142 02/21/2024 10:10 AM    CALCIUM 8.8 02/21/2024 10:10 AM    LABGLOM 53 02/21/2024 10:10 AM      No results found for: \"BNP\"        Current meds:    Current Facility-Administered Medications:     sodium chloride flush 0.9 % injection 5-40 mL, 5-40 mL, IntraVENous, 2 times per day, Milka Tolbert MD    sodium chloride flush 0.9 % injection 5-40 mL, 5-40 mL, IntraVENous, PRN, Milka Tolbert MD    0.9 % sodium chloride infusion, , IntraVENous, PRN, Milka Tolbert MD    potassium chloride (KLOR-CON) extended release tablet 40 mEq, 40 mEq, Oral, PRN **OR** potassium bicarb-citric acid (EFFER-K) effervescent tablet 40 mEq, 40 mEq, Oral, PRN **OR** potassium chloride 10 mEq/100 mL IVPB (Peripheral Line), 10 mEq, IntraVENous, PRN, Milka Tolbert MD    magnesium sulfate 2000 mg in 50 mL IVPB premix, 2,000 mg, IntraVENous, PRN, Milka Tolbert MD    polyethylene glycol (GLYCOLAX) packet 17 g, 17 g, Oral, Daily PRN, Milka Tolbert MD    acetaminophen (TYLENOL) tablet 650 mg, 650 mg, Oral, Q6H PRN **OR** acetaminophen (TYLENOL) suppository 650 mg, 650 mg, Rectal, Q6H PRN, Milka Tolbert MD    atorvastatin (LIPITOR) tablet 10 mg, 10 mg, Oral, Nightly, Milka Tolbert MD    [START ON 2/22/2024] finasteride (PROSCAR) tablet 5 mg, 5 mg, Oral, Daily, Milka Tolbert MD    [START ON 2/22/2024] eltrombopag (PROMACTA) 12.5 MG tablet TABS 12.5 mg (Patient Supplied), 12.5 mg, Oral, QAM AC, Milka Tolbert MD    QUEtiapine (SEROQUEL) tablet 50 mg, 50 mg, Oral, BID, Milka Tolbert MD    donepezil (ARICEPT) tablet 10 mg, 10 mg, Oral, Nightly, Milka Tolbert MD    Current Outpatient Medications:     amLODIPine (NORVASC) 10 MG tablet, Take 1 tablet by mouth daily, Disp: , Rfl:     ammonium lactate (AMLACTIN) 12 % cream, Apply topically daily Apply topically to legs, Disp: , Rfl:     acetaminophen (TYLENOL) 325

## 2024-02-21 NOTE — ED TRIAGE NOTES
Pt is brought in via [facility] FROM [facility] care unit-- for a GLF--Pt has a hematoma to left forehead.     Pt was discharged from this hospital for GLF EARLIER THIS am.     Pt is a DNR--DNR is present on arrival.

## 2024-02-21 NOTE — ED PROVIDER NOTES
[facility] EMERGENCY DEPT  EMERGENCY DEPARTMENT ENCOUNTER      Pt Name: Naldo Polanco Jr  MRN: [id number]  Birthdate 1935  Date of evaluation: 2/21/2024  Provider: Hunter Vigil MD    CHIEF COMPLAINT       Chief Complaint   Patient presents with    Fall         HISTORY OF PRESENT ILLNESS   (Location/Symptom, Timing/Onset, Context/Setting, Quality, Duration, Modifying Factors, Severity)  Note limiting factors.   Patient is an 88-year-old male present emergency department after patient was just discharged from the emergency department after he had a ground-level fall onto the right side causing a right shoulder dislocation.  Patient received CT head imaging, C-spine underwent conscious sedation with shoulder reduction was discharged this morning went back to the facility when he went to ambulate became extremely weak causing another fall striking his head.  Daughter reports to EMS that he has had increased weakness and falls that is not normal for him.  Patient has a history of dementia unable to provide HPI.            Review of External Medical Records:     Nursing Notes were reviewed.    REVIEW OF SYSTEMS    (2-9 systems for level 4, 10 or more for level 5)     Review of Systems    Except as noted above the remainder of the review of systems was reviewed and negative.       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     Dementia with aggressive behavior (HCC)     ED (erectile dysfunction)     Elevated PSA     HTN (hypertension)     Hydronephrosis     Hyperlipidemia     Right fascicular block 05/25/2016         SURGICAL HISTORY       Past Surgical History:   Procedure Laterality Date    CT BONE MARROW BIOPSY  2/1/2023    CT BONE MARROW BIOPSY 2/1/2023 [facility] RAD CT         CURRENT MEDICATIONS       Previous Medications    ACETAMINOPHEN (TYLENOL) 650 MG EXTENDED RELEASE TABLET    Take 1 tablet by mouth every 8 hours as

## 2024-02-21 NOTE — ED PROVIDER NOTES
[facility] EMERGENCY DEPT  EMERGENCY DEPARTMENT ENCOUNTER      Pt Name: Naldo Polanco Jr  MRN: [id number]  Birthdate 1935  Date of evaluation: 2/21/2024  Provider: Gerardo Sam MD    CHIEF COMPLAINT     No chief complaint on file.        HISTORY OF PRESENT ILLNESS   (Location/Symptom, Timing/Onset, Context/Setting, Quality, Duration, Modifying Factors, Severity)  Note limiting factors.   The history is provided by the EMS personnel and medical records. The history is limited by the condition of the patient.     88 y.o. 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 Pt is able to answer questions about sx, but unable to recall events of fall; per EMS report, staff reported pt fell onto R side. Pt had c/o HA, R shoulder pain, and had noted skin tears to elbows and L hand. He reports pain to R shoulder here, denies other injuries. He is not anticoagulated.     Review of External Medical Records:   Heme/onc notes reviewed, pt with systolic HTN and bradycardia at last visit    Nursing Notes were reviewed.    REVIEW OF SYSTEMS    (2-9 systems for level 4, 10 or more for level 5)     Review of Systems    Except as noted above the remainder of the review of systems was reviewed and negative.       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     Dementia with aggressive behavior (HCC)     ED (erectile dysfunction)     Elevated PSA     HTN (hypertension)     Hydronephrosis     Hyperlipidemia     Right fascicular block 05/25/2016         SURGICAL HISTORY       Past Surgical History:   Procedure Laterality Date    CT BONE MARROW BIOPSY  2/1/2023    CT BONE MARROW BIOPSY 2/1/2023 [facility] RAD CT    EP DEVICE PROCEDURE N/A 2/21/2024    Insert PPM dual performed by Swetha Gaspar MD at [facility] CARDIAC CATH LAB     VIEWS)   Final Result      Interval reduction of anteroinferior shoulder displacement.      XR ELBOW LEFT (2 VIEWS)   Final Result   Anteromedial shoulder dislocation      Clinical history: fall   INDICATION:   fall   COMPARISON: 2022      FINDINGS:   AP portable upright view of the chest demonstrates a stable  cardiopericardial   silhouette.There is no pleural effusion.There is no focal consolidation.There is   no pneumothorax.      IMPRESSION:   No acute intrathoracic process is identified.       .      XR HUMERUS RIGHT (MIN 2 VIEWS)   Final Result   Anteromedial shoulder dislocation      Clinical history: fall   INDICATION:   fall   COMPARISON: 2022      FINDINGS:   AP portable upright view of the chest demonstrates a stable  cardiopericardial   silhouette.There is no pleural effusion.There is no focal consolidation.There is   no pneumothorax.      IMPRESSION:   No acute intrathoracic process is identified.       .      XR CHEST PORTABLE   Final Result   Anteromedial shoulder dislocation      Clinical history: fall   INDICATION:   fall   COMPARISON: 2022      FINDINGS:   AP portable upright view of the chest demonstrates a stable  cardiopericardial   silhouette.There is no pleural effusion.There is no focal consolidation.There is   no pneumothorax.      IMPRESSION:   No acute intrathoracic process is identified.       .      CT HEAD WO CONTRAST   Final Result   No acute intracranial process is identified.    Please see above for additional nonemergent incidental findings.   There is no acute fracture or dislocation identified.   Mild to moderate chronic microvascular change and moderate temporal predominant   cerebral atrophy.         CT CERVICAL SPINE WO CONTRAST   Final Result   No acute intracranial process is identified.    Please see above for additional nonemergent incidental findings.   There is no acute fracture or dislocation identified.   Mild to moderate chronic microvascular change and moderate

## 2024-02-21 NOTE — CARE COORDINATION
CARE MANAGEMENT NOTE      CM received a call from Melisa VALDEZ with [facility] stating they received a referral today from Pt's [facility]. They were unable to open due to return to ER but would be interested in following up upon discharge.       _____________________________________  ADRIA De  [facility]- Care Management   Available via [employer]  2/21/2024   4:17 PM

## 2024-02-22 ENCOUNTER — APPOINTMENT (OUTPATIENT)
Facility: HOSPITAL | Age: 89
DRG: 243 | End: 2024-02-22
Payer: MEDICARE

## 2024-02-22 VITALS
OXYGEN SATURATION: 96 % | RESPIRATION RATE: 18 BRPM | WEIGHT: 199 LBS | BODY MASS INDEX: 26.95 KG/M2 | HEIGHT: 72 IN | SYSTOLIC BLOOD PRESSURE: 141 MMHG | HEART RATE: 62 BPM | DIASTOLIC BLOOD PRESSURE: 66 MMHG | TEMPERATURE: 97.3 F

## 2024-02-22 LAB
ANION GAP SERPL CALC-SCNC: 4 MMOL/L (ref 5–15)
BASOPHILS # BLD: 0 K/UL (ref 0–0.1)
BASOPHILS NFR BLD: 0 % (ref 0–1)
BUN SERPL-MCNC: 26 MG/DL (ref 6–20)
BUN/CREAT SERPL: 32 (ref 12–20)
CALCIUM SERPL-MCNC: 8.3 MG/DL (ref 8.5–10.1)
CHLORIDE SERPL-SCNC: 112 MMOL/L (ref 97–108)
CO2 SERPL-SCNC: 28 MMOL/L (ref 21–32)
CREAT SERPL-MCNC: 0.81 MG/DL (ref 0.7–1.3)
DIFFERENTIAL METHOD BLD: ABNORMAL
ECHO AO ARCH DIAM: 2.7 CM
ECHO AO ASC DIAM: 3.4 CM
ECHO AO ASCENDING AORTA INDEX: 1.6 CM/M2
ECHO AV AREA PEAK VELOCITY: 2.2 CM2
ECHO AV AREA VTI: 2.1 CM2
ECHO AV AREA/BSA PEAK VELOCITY: 1 CM2/M2
ECHO AV AREA/BSA VTI: 1 CM2/M2
ECHO AV MEAN GRADIENT: 7 MMHG
ECHO AV MEAN VELOCITY: 1.2 M/S
ECHO AV PEAK GRADIENT: 12 MMHG
ECHO AV PEAK VELOCITY: 1.7 M/S
ECHO AV VELOCITY RATIO: 0.82
ECHO AV VTI: 36.7 CM
ECHO BSA: 2.14 M2
ECHO LV E' LATERAL VELOCITY: 8 CM/S
ECHO LV E' SEPTAL VELOCITY: 5 CM/S
ECHO LV EDV A2C: 71 ML
ECHO LV EDV A4C: 77 ML
ECHO LV EDV BP: 75 ML (ref 67–155)
ECHO LV EDV INDEX A4C: 36 ML/M2
ECHO LV EDV INDEX BP: 35 ML/M2
ECHO LV EDV NDEX A2C: 33 ML/M2
ECHO LV EJECTION FRACTION A2C: 62 %
ECHO LV EJECTION FRACTION A4C: 55 %
ECHO LV ESV A2C: 27 ML
ECHO LV ESV A4C: 34 ML
ECHO LV ESV BP: 31 ML (ref 22–58)
ECHO LV ESV INDEX A2C: 13 ML/M2
ECHO LV ESV INDEX A4C: 16 ML/M2
ECHO LV ESV INDEX BP: 15 ML/M2
ECHO LV FRACTIONAL SHORTENING: 33 % (ref 28–44)
ECHO LV INTERNAL DIMENSION DIASTOLE INDEX: 2.25 CM/M2
ECHO LV INTERNAL DIMENSION DIASTOLIC: 4.8 CM (ref 4.2–5.9)
ECHO LV INTERNAL DIMENSION SYSTOLIC INDEX: 1.5 CM/M2
ECHO LV INTERNAL DIMENSION SYSTOLIC: 3.2 CM
ECHO LV IVSD: 0.9 CM (ref 0.6–1)
ECHO LV MASS 2D: 147.8 G (ref 88–224)
ECHO LV MASS INDEX 2D: 69.4 G/M2 (ref 49–115)
ECHO LV POSTERIOR WALL DIASTOLIC: 0.9 CM (ref 0.6–1)
ECHO LV RELATIVE WALL THICKNESS RATIO: 0.38
ECHO LVOT AREA: 2.5 CM2
ECHO LVOT AV VTI INDEX: 0.79
ECHO LVOT DIAM: 1.8 CM
ECHO LVOT MEAN GRADIENT: 5 MMHG
ECHO LVOT PEAK GRADIENT: 8 MMHG
ECHO LVOT PEAK VELOCITY: 1.4 M/S
ECHO LVOT STROKE VOLUME INDEX: 34.6 ML/M2
ECHO LVOT SV: 73.8 ML
ECHO LVOT VTI: 29 CM
ECHO MV A VELOCITY: 0.71 M/S
ECHO MV E DECELERATION TIME (DT): 429.1 MS
ECHO MV E VELOCITY: 0.64 M/S
ECHO MV E/A RATIO: 0.9
ECHO MV E/E' LATERAL: 8
ECHO MV E/E' RATIO (AVERAGED): 10.4
ECHO MV REGURGITANT PEAK GRADIENT: 100 MMHG
ECHO MV REGURGITANT PEAK VELOCITY: 5 M/S
ECHO PV MAX VELOCITY: 1.1 M/S
ECHO PV PEAK GRADIENT: 5 MMHG
ECHO RV FREE WALL PEAK S': 13 CM/S
ECHO RV INTERNAL DIMENSION: 4 CM
ECHO RV TAPSE: 1.8 CM (ref 1.7–?)
ECHO TV REGURGITANT MAX VELOCITY: 2.74 M/S
ECHO TV REGURGITANT PEAK GRADIENT: 30 MMHG
EOSINOPHIL # BLD: 0 K/UL (ref 0–0.4)
EOSINOPHIL NFR BLD: 0 % (ref 0–7)
ERYTHROCYTE [DISTWIDTH] IN BLOOD BY AUTOMATED COUNT: 12.4 % (ref 11.5–14.5)
GLUCOSE SERPL-MCNC: 114 MG/DL (ref 65–100)
HCT VFR BLD AUTO: 33.6 % (ref 36.6–50.3)
HGB BLD-MCNC: 11 G/DL (ref 12.1–17)
IMM GRANULOCYTES # BLD AUTO: 0 K/UL (ref 0–0.04)
IMM GRANULOCYTES NFR BLD AUTO: 0 % (ref 0–0.5)
LYMPHOCYTES # BLD: 1.7 K/UL (ref 0.8–3.5)
LYMPHOCYTES NFR BLD: 17 % (ref 12–49)
MAGNESIUM SERPL-MCNC: 2.2 MG/DL (ref 1.6–2.4)
MCH RBC QN AUTO: 30.5 PG (ref 26–34)
MCHC RBC AUTO-ENTMCNC: 32.7 G/DL (ref 30–36.5)
MCV RBC AUTO: 93.1 FL (ref 80–99)
MONOCYTES # BLD: 1.2 K/UL (ref 0–1)
MONOCYTES NFR BLD: 12 % (ref 5–13)
NEUTS SEG # BLD: 6.9 K/UL (ref 1.8–8)
NEUTS SEG NFR BLD: 71 % (ref 32–75)
NRBC # BLD: 0 K/UL (ref 0–0.01)
NRBC BLD-RTO: 0 PER 100 WBC
PHOSPHATE SERPL-MCNC: 2.4 MG/DL (ref 2.6–4.7)
PLATELET # BLD AUTO: 212 K/UL (ref 150–400)
PMV BLD AUTO: 10.3 FL (ref 8.9–12.9)
POTASSIUM SERPL-SCNC: 3.6 MMOL/L (ref 3.5–5.1)
RBC # BLD AUTO: 3.61 M/UL (ref 4.1–5.7)
SODIUM SERPL-SCNC: 144 MMOL/L (ref 136–145)
WBC # BLD AUTO: 9.8 K/UL (ref 4.1–11.1)

## 2024-02-22 PROCEDURE — 6370000000 HC RX 637 (ALT 250 FOR IP): Performed by: STUDENT IN AN ORGANIZED HEALTH CARE EDUCATION/TRAINING PROGRAM

## 2024-02-22 PROCEDURE — 99232 SBSQ HOSP IP/OBS MODERATE 35: CPT | Performed by: INTERNAL MEDICINE

## 2024-02-22 PROCEDURE — 85025 COMPLETE CBC W/AUTO DIFF WBC: CPT

## 2024-02-22 PROCEDURE — 97166 OT EVAL MOD COMPLEX 45 MIN: CPT

## 2024-02-22 PROCEDURE — 2700000000 HC OXYGEN THERAPY PER DAY

## 2024-02-22 PROCEDURE — 2580000003 HC RX 258: Performed by: STUDENT IN AN ORGANIZED HEALTH CARE EDUCATION/TRAINING PROGRAM

## 2024-02-22 PROCEDURE — 94761 N-INVAS EAR/PLS OXIMETRY MLT: CPT

## 2024-02-22 PROCEDURE — 2580000003 HC RX 258: Performed by: INTERNAL MEDICINE

## 2024-02-22 PROCEDURE — 36415 COLL VENOUS BLD VENIPUNCTURE: CPT

## 2024-02-22 PROCEDURE — 93306 TTE W/DOPPLER COMPLETE: CPT

## 2024-02-22 PROCEDURE — 6370000000 HC RX 637 (ALT 250 FOR IP): Performed by: INTERNAL MEDICINE

## 2024-02-22 PROCEDURE — APPSS30 APP SPLIT SHARED TIME 16-30 MINUTES: Performed by: NURSE PRACTITIONER

## 2024-02-22 PROCEDURE — 93306 TTE W/DOPPLER COMPLETE: CPT | Performed by: SPECIALIST

## 2024-02-22 PROCEDURE — 97535 SELF CARE MNGMENT TRAINING: CPT

## 2024-02-22 PROCEDURE — 97530 THERAPEUTIC ACTIVITIES: CPT

## 2024-02-22 PROCEDURE — 97162 PT EVAL MOD COMPLEX 30 MIN: CPT

## 2024-02-22 PROCEDURE — 83735 ASSAY OF MAGNESIUM: CPT

## 2024-02-22 PROCEDURE — 97116 GAIT TRAINING THERAPY: CPT

## 2024-02-22 PROCEDURE — 80048 BASIC METABOLIC PNL TOTAL CA: CPT

## 2024-02-22 PROCEDURE — 84100 ASSAY OF PHOSPHORUS: CPT

## 2024-02-22 RX ORDER — CEPHALEXIN 500 MG/1
500 CAPSULE ORAL EVERY 8 HOURS
Qty: 6 CAPSULE | Refills: 0 | Status: SHIPPED | OUTPATIENT
Start: 2024-02-22 | End: 2024-02-24

## 2024-02-22 RX ADMIN — QUETIAPINE FUMARATE 50 MG: 100 TABLET ORAL at 09:53

## 2024-02-22 RX ADMIN — CEPHALEXIN 500 MG: 250 CAPSULE ORAL at 15:08

## 2024-02-22 RX ADMIN — SODIUM CHLORIDE, PRESERVATIVE FREE 10 ML: 5 INJECTION INTRAVENOUS at 09:54

## 2024-02-22 RX ADMIN — CEPHALEXIN 500 MG: 250 CAPSULE ORAL at 05:59

## 2024-02-22 RX ADMIN — SODIUM CHLORIDE, PRESERVATIVE FREE 10 ML: 5 INJECTION INTRAVENOUS at 09:57

## 2024-02-22 RX ADMIN — FINASTERIDE 5 MG: 5 TABLET, FILM COATED ORAL at 09:53

## 2024-02-22 NOTE — DISCHARGE INSTRUCTIONS
You came to the hospital with a fall and were found to have very low heart rate. You had a pacemaker placed by electrophysiologist. Your heart is pumping normally on echocardiogram.   Please follow up with PCP and cardiologist.

## 2024-02-22 NOTE — CARE COORDINATION
Care Management Discharge Note:      02/22/24 1618   Discharge Planning   Type of Residence Assisted living;Other (Comment)  (Memory Care)   Patient expects to be discharged to: Assisted living   Services At/After Discharge   Transition of Care Consult (CM Consult) N/A   Services At/After Discharge None   Mode of Transport at Discharge [facility] Transport Time of Discharge 1700   Confirm Follow Up Transport Family     Patient with dc orders. CM set up transport via  with [facility].  for 5pm. CM updated family and primary RN Tennille as to dc plan/time. Patient will return to Memory Care Unit @ [facility].     ______________________  Krystal LEVINE, RN  Care Management  2/22/2024  4:21 PM

## 2024-02-22 NOTE — PLAN OF CARE
Problem: Physical Therapy - Adult  Goal: By Discharge: Performs mobility at highest level of function for planned discharge setting.  See evaluation for individualized goals.  Description: FUNCTIONAL STATUS PRIOR TO ADMISSION: Patient was independent without use of DME.     HOME SUPPORT PRIOR TO ADMISSION: the patient lived at [facility]. Was independent with dressing, toileting, ambulation.   Physical Therapy Goals  Initiated 2/22/2024  1.  Patient will move from supine to sit and sit to supine, scoot up and down, and roll side to side in bed with contact guard assist within 7 day(s).    2.  Patient will perform sit to stand with contact guard assist within 7 day(s).  3.  Patient will transfer from bed to chair and chair to bed with contact guard assist using the least restrictive device within 7 day(s).  4.  Patient will ambulate with contact guard assist for 500 feet with the least restrictive device within 7 day(s).       Outcome: Progressing   PHYSICAL THERAPY EVALUATION    Patient: Naldo Polanco Jr (88 y.o. male)  Date: 2/22/2024  Primary Diagnosis: Syncope and collapse [R55]  Severe sinus bradycardia [R00.1]  Bradycardia [R00.1]  Hypoxia [R09.02]  Injury of head, subsequent encounter [S09.90XD]  Procedure(s) (LRB):  Insert PPM dual (N/A)  Ultrasound guided vascular access (N/A) 1 Day Post-Op   Precautions: Restrictions/Precautions: Bed Alarm  Implants present? : Pacemaker                      ASSESSMENT :   DEFICITS/IMPAIRMENTS:   The patient is limited by decreased functional mobility, independence in ADLs, ROM, strength, activity tolerance, command following, attention/concentration, balance, and new pacemaker precautions in the setting of hospital re-admission after syncope and collapse at [facility].  Patient now POD1 L access pacemaker and had his R shoulder relocated in the ED as well. Patient with history of dementia, A&Ox0-1 at baseline per daughter. Patient with no recollection of  safety  Problem Solving: Decreased awareness of errors;Assistance required to implement solutions;Assistance required to identify errors made;Assistance required to correct errors made;Assistance required to generate solutions  Insights: Not aware of deficits  Initiation: Requires cues for some  Sequencing: Requires cues for some    Skin: PPM access bandage clean and dry. L clavicle step off deformity     Edema: none apparent     Hearing:   Hearing  Hearing: Within functional limits    Vision/Perceptual:          Vision  Vision: Within Functional Limits       Strength:    Strength: Generally decreased, functional    Tone & Sensation:   Tone: Normal  Sensation: Intact    Coordination:  Coordination: Generally decreased, functional    Range Of Motion:  AROM: Generally decreased, functional (LUE pacemaker precautions)       Functional Mobility:  Bed Mobility:     Bed Mobility Training  Bed Mobility Training: No  Transfers:     Transfer Training  Transfer Training: Yes  Sit to Stand: Minimum assistance;Assist X1  Stand to Sit: Contact-guard assistance  Balance:               Balance  Sitting: Intact  Standing: Impaired  Standing - Static: Constant support;Good  Standing - Dynamic: Constant support;Fair  Ambulation/Gait Training:                       Gait  Overall Level of Assistance: Contact-guard assistance;Assist X1  Distance (ft): 120 Feet (+25 + 25)  Assistive Device: Gait belt;Brace/splint  Interventions: Tactile cues;Manual cues;Safety awareness training;Visual cues  Base of Support: Widened  Speed/Alethea: Accelerated  Gait Abnormalities: Path deviations (cervical flexion)                                                                                                                                                                                                                                                          [facility] AM-PAC®      Basic Mobility Inpatient Short Form (6-Clicks) Version 2  How

## 2024-02-22 NOTE — PROGRESS NOTES
3:45 PM  Patient arrived. ID and allergies verified verbally with patient. Pt voices understanding of procedure to be performed. Consent obtained. Pt prepped for procedure. Pt denies contrast allergy. Patient denies taking any blood thinners.  Dressing changed to left arm    4:08 PM  Verifying patient's daughter with two nurse Vielka and Karo from .         4:10 PM  TRANSFER - OUT REPORT:    Verbal report given to Karo Polanco Jr  being transferred to  for ordered procedure       Report consisted of patient's Situation, Background, Assessment and   Recommendations(SBAR).     Information from the following report(s) Nurse Handoff Report was reviewed with the receiving nurse.           Lines:   Peripheral IV 02/21/24 Right Antecubital (Active)   Site Assessment Clean, dry & intact 02/21/24 1011   Line Status Blood return noted 02/21/24 1011   Phlebitis Assessment No symptoms 02/21/24 1011   Infiltration Assessment 0 02/21/24 1011        Opportunity for questions and clarification was provided.      Patient transported with:  Registered Nurse      5:10 PM  TRANSFER - IN REPORT:    Verbal report received from Karo on Naldo Polanco Jr  being received from  for routine post-op      Report consisted of patient's Situation, Background, Assessment and   Recommendations(SBAR).     Information from the following report(s) Nurse Handoff Report was reviewed with the receiving nurse.    Opportunity for questions and clarification was provided.      Assessment completed upon patient's arrival to unit and care assumed.     5:20 PM  Brenna from [facility] brought patient's medication [employer].     5:35 PM   Xray done    6:04 PM  TRANSFER - OUT REPORT:    Verbal report given to Atruro on Naldo Polanco Jr  being transferred to [facility] for routine post-op       Report consisted of patient's Situation, Background, Assessment and   Recommendations(SBAR).     Information from the following report(s) Nurse Handoff Report was reviewed 
Medication History Review by Pharmacist:    Comments/Recommendations:   PTA medication list updated with paperwork from Taiwo Ruiz. A copy of the paperwork was given to the ED unit sec to scan into the EMR.  Last doses are not listed on the paperwork.    Prior to Admission Medications:   Current Facility-Administered Medications on File Prior to Encounter   Medication Dose Route Frequency Provider Last Rate Last Admin    [COMPLETED] lidocaine 2 % injection 20 mL  20 mL IntraDERmal Once Gerardo Sam MD   20 mL at 02/21/24 0524    [DISCONTINUED] bacitracin ointment   Topical NOW Gerardo Sam MD         Current Outpatient Medications on File Prior to Encounter   Medication Sig Dispense Refill    amLODIPine (NORVASC) 10 MG tablet Take 1 tablet by mouth daily      ammonium lactate (AMLACTIN) 12 % cream Apply topically daily Apply topically to legs      acetaminophen (TYLENOL) 325 MG tablet Take 2 tablets by mouth every 6 hours as needed for Pain      [DISCONTINUED] acetaminophen (TYLENOL) 650 MG extended release tablet Take 1 tablet by mouth every 8 hours as needed for Pain 30 tablet 0    PROMACTA 12.5 MG TABS tablet TAKE 1 TABLET DAILY BEFORE BREAKFAST 30 tablet 5    atorvastatin (LIPITOR) 10 MG tablet Take 1 tablet by mouth nightly      donepezil (ARICEPT) 10 MG tablet Take 1 tablet by mouth nightly      finasteride (PROSCAR) 5 MG tablet Take 1 tablet by mouth daily      QUEtiapine (SEROQUEL) 50 MG tablet Take 1 tablet by mouth 2 times daily      [DISCONTINUED] ACETAMINOPHEN PO Take by mouth         Thank you,      Olga Gonzales, PharmD, BCPS    
Occupational Therapy:  02/22/24    Orders received, chart reviewed and patient evaluated by occupational therapy. Pending progression with skilled acute occupational therapy, recommend:  Return to [facility] with increased assistance (close supervision/SBA to min A for all OOB activity (transfers, ADLs, etc.) due to cognition); if unable to have this level of support, recommend SNF.     Patient Vitals:   Pulse BP Patient Position   02/22/24 1113 61 127/60 Sitting in recliner (post activity)   02/22/24 1105 95 (!) 146/69 Standing   02/22/24 1100 71 (!) 140/70 Sitting EOB   02/22/24 1050 63 138/69 Supine     Recommend with nursing patient to complete as able in order to maintain strength, endurance and independence: OOB to chair 3x/day, ADLs with supervision/setup and mobilizing to the bathroom for toileting with 1 person assist. Thank you for your assistance.     Full evaluation to follow.     Thank you,  Trini Bull, OTR/L    
139/72 139/72 (!) 143/77   Pulse: 71 74 74 72   Resp: 18 17  16   Temp: 98.2 °F (36.8 °C) 98.2 °F (36.8 °C) 98.2 °F (36.8 °C) 99.1 °F (37.3 °C)   TempSrc: Axillary Axillary Axillary Oral   SpO2: 98% 97%  99%   Weight:       Height:         Telemetry: SB 1st ABV > Second degree AV block     Gen: Elderly, in no acute distress  Neck: Supple, No JVD, No Carotid Bruit  Resp: No accessory muscle use, Clear breath sounds, No rales or rhonchi  Card: Slow rate, regular Rhythm, Normal S1, S2, No murmurs, rubs or gallop.  Abd:   Soft, non-tender, non-distended, BS+   MSK: No cyanosis  Skin: Multiple ecchymotic areas  Neuro: Moving all four extremities, follows some commands appropriately  Psych: ? insight, oriented to person  LE: No edema    Data Review:     Radiology:   XR Results (most recent):Xray Result (most recent):  XR CHEST PORTABLE 02/21/2024    Narrative  EXAM: XR CHEST PORTABLE    INDICATION: Pacemaker placement and rule out pneumothorax    COMPARISON: 2/21/2024    FINDINGS: A portable AP radiograph of the chest was obtained at 1740 hours.  Status post left subclavian pacemaker placement.. Mild pulmonary edema. No  pneumothorax.. The cardiac and mediastinal contours and pulmonary vascularity  are normal.  The bones and soft tissues are grossly within normal limits.    Impression  No pneumothorax status post left subclavian pacemaker. Mild  pulmonary edema.       CT Result (most recent):  CT HEAD WO CONTRAST 02/21/2024    Narrative  EXAM: CT HEAD WO CONTRAST    INDICATION: GLF with head injury    COMPARISON: CT performed 6 hours prior.    CONTRAST: None.    TECHNIQUE: Unenhanced CT of the head was performed using 5 mm images. Brain and  bone windows were generated. Coronal and sagittal reformats. CT dose reduction  was achieved through use of a standardized protocol tailored for this  examination and automatic exposure control for dose modulation.    FINDINGS:  The bone windows demonstrate no abnormalities. The

## 2024-02-22 NOTE — DISCHARGE SUMMARY
Discharge Summary   Please note that this dictation was completed with [employer], the computer voice recognition software.  Quite often unanticipated grammatical, syntax, homophones, and other interpretive errors are inadvertently transcribed by the computer software.  Please disregard these errors.  Please excuse any errors that have escaped final proofreading.    PATIENT ID: Naldo Polanco Jr  MRN: [id number]   YOB: 1935    DATE OF ADMISSION: 2/21/2024  9:47 AM    DATE OF DISCHARGE: 2/22/2024  PRIMARY CARE PROVIDER: Yang Hernandez MD         ATTENDING PHYSICIAN: REENA ROQUE MD  DISCHARGING PROVIDER: REENA ROQUE MD       CONSULTATIONS: IP CONSULT TO CARDIOLOGY  IP CONSULT TO ELECTROPHYSIOLOGY  IP WOUND CARE NURSE CONSULT TO EVAL    PROCEDURES/SURGERIES: Procedure(s):  Insert PPM dual  Ultrasound guided vascular access    ADMITTING HPI from excerpted H&P   88 y.o. 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      Patient has dementia and a poor historian. Most of the history is obtained by chart review. Patient lives at [facility]. Patient had a fall this AM  and presented to [facility] ER and was noted to have right shoulder dislocation. He underwent shoulder reduction at bedside by ER physician under sedation and discharged to [facility]. He attempted to ambulate at facility, experienced generalized weakness and had another fall striking his head and brought back to ER.         HOSPITAL COURSE & DISCHARGE DIAGNOSIS/ PLAN:     Bradycardia   Elevated troponin   EKG interpreted independently-sinus bradycardia with 1st degree AVB. Troponin 589, suspect demand ischemia. Patient is not on any matt blocking agents. Had PPM placed. EF 66%.         Recurrent falls   due to pre-syncopal symptoms due to underlying arrhythmias. PT/OT were consulted and recommended home health at [facility]        Right shoulder dislocation s/p reduction

## 2024-02-22 NOTE — PLAN OF CARE
Functional Limits     Range of Motion:   AROM: Generally decreased, functional (in bilateral shoulders - recent dislocation of R shoulder and new PPM precautions to L shoulder)  PROM: Generally decreased, functional    Strength:  Strength: Within functional limits    Coordination:  Coordination: Generally decreased, functional     Coordination: Generally decreased, functional      Tone & Sensation:   Tone: Normal    Functional Mobility and Transfers for ADLs:    Bed Mobility:     Bed Mobility Training  Bed Mobility Training: Yes  Supine to Sit: Moderate assistance (physical assist to prevent pushing down through L UE)  Scooting: Contact-guard assistance    Transfers:      Transfer Training  Transfer Training: Yes  Sit to Stand: Minimum assistance;Assist X1  Stand to Sit: Contact-guard assistance     Balance:   Standing: Impaired  Balance  Sitting: Intact  Standing: Impaired  Standing - Static: Constant support;Good  Standing - Dynamic: Constant support;Fair    ADL Assessment:   Feeding: Setup     Grooming: Setup;Minimal assistance  Grooming Skilled Clinical Factors: able to use bilateral hands; cues for safety    Skin Care: Other (comment);Chlorhexidine wipes (ant care)    UE Dressing: Moderate assistance  UE Dressing Skilled Clinical Factors: due to new PPM precautions to L UE and limited AROM in R shoulder from recent dislocation    Toileting: Minimal assistance  Toileting Skilled Clinical Factors: simulated; due to cognition; cues for safety    ADL Intervention and task modifications:    Pt educated on pacemaker precautions to L UE, including 3 P's (no Pushing, Pulling, and Picking up) and ROM limitations (no lifting L UE above 90* and no bringing it behind the back). Pt verbalized understanding of same but with poor carry-over and requires frequent cues and re-education during activity.     [employer] AM-PACTM \"6 Clicks\"                                                       Daily Activity Inpatient Short  Form  How much help from another person does the patient currently need... Total; A Lot A Little None   1.  Putting on and taking off regular lower body clothing? []  1 [x]  2 []  3 []  4   2.  Bathing (including washing, rinsing, drying)? []  1 []  2 [x]  3 []  4   3.  Toileting, which includes using toilet, bedpan or urinal? [] 1 []  2 [x]  3 []  4   4.  Putting on and taking off regular upper body clothing? []  1 [x]  2 []  3 []  4   5.  Taking care of personal grooming such as brushing teeth? []  1 []  2 [x]  3 []  4   6.  Eating meals? []  1 []  2 []  3 [x]  4   © 2007, Trustees of [facility], under license to [employer]. All rights reserved     Score: 17/24     Interpretation of Tool:  Represents clinically-significant functional categories (i.e. Activities of daily living).    Cutoff score 39.4 (19) correlates to a good likelihood of discharging home versus a facility  Ruby Bejarano, Krystal Goldstein, Shaheen Drew, Brenda Araiza, Jovi Rojo, Andre Bejarano, AM-PAC “6-Clicks” Functional Assessment Scores Predict Acute Care Hospital Discharge Destination, Physical Therapy, Volume 94, Issue 9, 1 September 2014, Pages 0453-4261, [email or web address]    Pain Rating:  Pt reporting minimal pain  Pain Intervention(s):   pain is at a level acceptable to the patient    Activity Tolerance:   Fair     After treatment:   Patient left in no apparent distress sitting up in chair, Call bell within reach, Bed/ chair alarm activated, Caregiver / family present, and sitter present at bedside    COMMUNICATION/EDUCATION:   The patient's plan of care was discussed with: physical therapist, registered nurse, and     Patient Education  Education Given To: Patient  Education Provided: Role of Therapy;Plan of Care;ADL Adaptive Strategies;Transfer Training;Precautions;Orientation;Fall Prevention Strategies  Education Method: Verbal;Demonstration  Barriers to Learning:

## 2024-02-22 NOTE — CARE COORDINATION
Care Management Initial Assessment Note:       02/22/24 1243   Service Assessment   Cognition Dementia / Early Alzheimer's   Discharge Planning   Type of Residence Assisted living  (Memory Care at [facility])   Patient expects to be discharged to: Assisted living   Services At/After Discharge   Transition of Care Consult (CM Consult) N/A   Mode of Transport at Discharge Other (see comment)  (family)        Patient with low readmission risk score of 13%. No identified CM needs. Please consult CM for any discharge planning needs that may arise. Patient expects to dc back to Memory Care at [facility].      ______________________  Krystal LEVINE, RN  Care Management  2/22/2024  9:51 AM

## 2024-03-01 ENCOUNTER — PROCEDURE VISIT (OUTPATIENT)
Age: 89
End: 2024-03-01
Payer: MEDICARE

## 2024-03-01 DIAGNOSIS — Z95.0 CARDIAC PACEMAKER IN SITU: Primary | ICD-10-CM

## 2024-03-01 PROCEDURE — 93280 PM DEVICE PROGR EVAL DUAL: CPT | Performed by: INTERNAL MEDICINE

## 2024-03-01 NOTE — PROGRESS NOTES
Patient presents for wound check post-device implantation. The dressing was removed and the site was inspected. The site appeared to be well-healing without ecchymosis/tenderness/erythema. Denies pain, fevers, discharge.       Future Appointments   Date Time Provider Department Center   3/12/2024  9:45 AM SS INF7 CH1 LAB [facility]   4/23/2024 10:00 AM SS INF5 CH4 <1H [facility]   4/23/2024 10:45 AM Isela Velasco MD ON[facility] BS AMB   5/24/2024  9:40 AM PACEMAKER, STFRANCES [facility] BS AMB   5/24/2024 10:00 AM Rasheeda Dsouza APRN - CNP [facility] BS AMB   6/4/2024  9:30 AM SS INF5 CH4 <1H [facility]         Continue follow up in device clinic as planned.

## 2024-03-06 ENCOUNTER — HOSPITAL ENCOUNTER (EMERGENCY)
Facility: HOSPITAL | Age: 89
Discharge: HOME | End: 2024-03-06
Attending: STUDENT IN AN ORGANIZED HEALTH CARE EDUCATION/TRAINING PROGRAM
Payer: MEDICARE

## 2024-03-06 ENCOUNTER — APPOINTMENT (OUTPATIENT)
Dept: VASCULAR SURGERY | Facility: HOSPITAL | Age: 89
End: 2024-03-06
Payer: MEDICARE

## 2024-03-06 ENCOUNTER — APPOINTMENT (OUTPATIENT)
Facility: HOSPITAL | Age: 89
End: 2024-03-06
Payer: MEDICARE

## 2024-03-06 VITALS
BODY MASS INDEX: 27.09 KG/M2 | WEIGHT: 200 LBS | RESPIRATION RATE: 16 BRPM | TEMPERATURE: 98.1 F | OXYGEN SATURATION: 94 % | HEIGHT: 72 IN | HEART RATE: 69 BPM | SYSTOLIC BLOOD PRESSURE: 126 MMHG | DIASTOLIC BLOOD PRESSURE: 71 MMHG

## 2024-03-06 DIAGNOSIS — M79.89 SWELLING OF RIGHT HAND: ICD-10-CM

## 2024-03-06 DIAGNOSIS — I82.611 SUPERFICIAL VENOUS THROMBOSIS OF RIGHT UPPER EXTREMITY: Primary | ICD-10-CM

## 2024-03-06 PROCEDURE — 73130 X-RAY EXAM OF HAND: CPT

## 2024-03-06 PROCEDURE — 93971 EXTREMITY STUDY: CPT

## 2024-03-06 PROCEDURE — 99284 EMERGENCY DEPT VISIT MOD MDM: CPT

## 2024-03-06 ASSESSMENT — LIFESTYLE VARIABLES
HOW OFTEN DO YOU HAVE A DRINK CONTAINING ALCOHOL: NEVER
HOW MANY STANDARD DRINKS CONTAINING ALCOHOL DO YOU HAVE ON A TYPICAL DAY: PATIENT DOES NOT DRINK

## 2024-03-06 ASSESSMENT — PAIN - FUNCTIONAL ASSESSMENT: PAIN_FUNCTIONAL_ASSESSMENT: NONE - DENIES PAIN

## 2024-03-06 NOTE — DISCHARGE INSTRUCTIONS
You have a small clot in a superficial vein in the R arm. These are typically not problematic or dangerous. Warm compresses and NSAIDs such as aspirin and ibuprofen can help with this. Please have this re-evaluated in 1 week by your primary doctor

## 2024-03-06 NOTE — ED PROVIDER NOTES
[facility] EMERGENCY DEPT  EMERGENCY DEPARTMENT ENCOUNTER      Pt Name: Naldo Polanco Jr  MRN: [id number]  Birthdate 1935  Date of evaluation: 3/6/2024  Provider: Jefry Ruiz DO    CHIEF COMPLAINT       Chief Complaint   Patient presents with    Hand Pain         HISTORY OF PRESENT ILLNESS   (Location/Symptom, Timing/Onset, Context/Setting, Quality, Duration, Modifying Factors, Severity)  Note limiting factors.   88-year-old male presents today with right hand swelling.  Has recent history of falls with a right shoulder injury as well as recent pacemaker placement.  Uncertain when the hand swelling started.  He denies any pain.  Denies any new falls today.  He has no complaints whatsoever.  He does have dementia though which makes him a poor historian.            Review of External Medical Records:     Nursing Notes were reviewed.    REVIEW OF SYSTEMS    (2-9 systems for level 4, 10 or more for level 5)     Review of Systems   Musculoskeletal:  Positive for joint swelling.       Except as noted above the remainder of the review of systems was reviewed and negative.       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

## 2024-03-06 NOTE — ED TRIAGE NOTES
Patient arrives via ems from [facility]. Patient has hx of DVTs. Patient was sent due to right hand swelling.  Patient denies pain

## 2024-03-07 LAB — ECHO BSA: 2.15 M2

## 2024-03-22 PROBLEM — R79.89 ELEVATED TROPONIN: Status: RESOLVED | Noted: 2022-08-30 | Resolved: 2024-03-22

## 2024-04-23 ENCOUNTER — HOSPITAL ENCOUNTER (OUTPATIENT)
Facility: HOSPITAL | Age: 89
Setting detail: INFUSION SERIES
Discharge: HOME | End: 2024-04-23
Payer: MEDICARE

## 2024-04-23 ENCOUNTER — OFFICE VISIT (OUTPATIENT)
Age: 89
End: 2024-04-23
Payer: MEDICARE

## 2024-04-23 VITALS
RESPIRATION RATE: 16 BRPM | HEART RATE: 75 BPM | TEMPERATURE: 97.7 F | DIASTOLIC BLOOD PRESSURE: 59 MMHG | BODY MASS INDEX: 25.51 KG/M2 | OXYGEN SATURATION: 98 % | WEIGHT: 188.1 LBS | SYSTOLIC BLOOD PRESSURE: 108 MMHG

## 2024-04-23 VITALS
OXYGEN SATURATION: 96 % | RESPIRATION RATE: 18 BRPM | TEMPERATURE: 98.3 F | HEART RATE: 72 BPM | BODY MASS INDEX: 25.65 KG/M2 | DIASTOLIC BLOOD PRESSURE: 66 MMHG | WEIGHT: 189.4 LBS | SYSTOLIC BLOOD PRESSURE: 100 MMHG | HEIGHT: 72 IN

## 2024-04-23 DIAGNOSIS — I10 ESSENTIAL (PRIMARY) HYPERTENSION: ICD-10-CM

## 2024-04-23 DIAGNOSIS — D69.3 IDIOPATHIC THROMBOCYTOPENIC PURPURA (ITP) (HCC): ICD-10-CM

## 2024-04-23 DIAGNOSIS — I82.611 SUPERFICIAL VENOUS THROMBOSIS OF RIGHT UPPER EXTREMITY: ICD-10-CM

## 2024-04-23 DIAGNOSIS — Z51.81 ENCOUNTER FOR THERAPEUTIC DRUG LEVEL MONITORING: ICD-10-CM

## 2024-04-23 DIAGNOSIS — D69.3 IDIOPATHIC THROMBOCYTOPENIC PURPURA (ITP) (HCC): Primary | ICD-10-CM

## 2024-04-23 DIAGNOSIS — Z79.899 OTHER LONG TERM (CURRENT) DRUG THERAPY: ICD-10-CM

## 2024-04-23 LAB
ALBUMIN SERPL-MCNC: 3.6 G/DL (ref 3.5–5)
ALBUMIN/GLOB SERPL: 1.1 (ref 1.1–2.2)
ALP SERPL-CCNC: 148 U/L (ref 45–117)
ALT SERPL-CCNC: 19 U/L (ref 12–78)
ANION GAP SERPL CALC-SCNC: 2 MMOL/L (ref 5–15)
AST SERPL-CCNC: 16 U/L (ref 15–37)
BASO+EOS+MONOS # BLD AUTO: 0.4 K/UL (ref 0.2–1.2)
BASO+EOS+MONOS NFR BLD AUTO: 6 % (ref 3.2–16.9)
BILIRUB SERPL-MCNC: 0.6 MG/DL (ref 0.2–1)
BUN SERPL-MCNC: 16 MG/DL (ref 6–20)
BUN/CREAT SERPL: 14 (ref 12–20)
CALCIUM SERPL-MCNC: 9.3 MG/DL (ref 8.5–10.1)
CHLORIDE SERPL-SCNC: 105 MMOL/L (ref 97–108)
CO2 SERPL-SCNC: 32 MMOL/L (ref 21–32)
CREAT SERPL-MCNC: 1.11 MG/DL (ref 0.7–1.3)
DIFFERENTIAL METHOD BLD: NORMAL
ERYTHROCYTE [DISTWIDTH] IN BLOOD BY AUTOMATED COUNT: 13.2 % (ref 11.8–15.8)
GLOBULIN SER CALC-MCNC: 3.4 G/DL (ref 2–4)
GLUCOSE SERPL-MCNC: 180 MG/DL (ref 65–100)
HCT VFR BLD AUTO: 39.6 % (ref 36.6–50.3)
HGB BLD-MCNC: 12.9 G/DL (ref 12.1–17)
LYMPHOCYTES # BLD: 1.7 K/UL (ref 0.8–3.5)
LYMPHOCYTES NFR BLD: 23 % (ref 12–49)
MCH RBC QN AUTO: 29.5 PG (ref 26–34)
MCHC RBC AUTO-ENTMCNC: 32.6 G/DL (ref 30–36.5)
MCV RBC AUTO: 90.6 FL (ref 80–99)
NEUTS SEG # BLD: 5.3 K/UL (ref 1.8–8)
NEUTS SEG NFR BLD: 71 % (ref 32–75)
PLATELET # BLD AUTO: 265 K/UL (ref 150–400)
POTASSIUM SERPL-SCNC: 3.9 MMOL/L (ref 3.5–5.1)
PROT SERPL-MCNC: 7 G/DL (ref 6.4–8.2)
RBC # BLD AUTO: 4.37 M/UL (ref 4.1–5.7)
SODIUM SERPL-SCNC: 139 MMOL/L (ref 136–145)
WBC # BLD AUTO: 7.4 K/UL (ref 4.1–11.1)

## 2024-04-23 PROCEDURE — G8427 DOCREV CUR MEDS BY ELIG CLIN: HCPCS | Performed by: INTERNAL MEDICINE

## 2024-04-23 PROCEDURE — 36415 COLL VENOUS BLD VENIPUNCTURE: CPT

## 2024-04-23 PROCEDURE — 85025 COMPLETE CBC W/AUTO DIFF WBC: CPT

## 2024-04-23 PROCEDURE — 99215 OFFICE O/P EST HI 40 MIN: CPT | Performed by: INTERNAL MEDICINE

## 2024-04-23 PROCEDURE — G8419 CALC BMI OUT NRM PARAM NOF/U: HCPCS | Performed by: INTERNAL MEDICINE

## 2024-04-23 PROCEDURE — 1036F TOBACCO NON-USER: CPT | Performed by: INTERNAL MEDICINE

## 2024-04-23 PROCEDURE — 1123F ACP DISCUSS/DSCN MKR DOCD: CPT | Performed by: INTERNAL MEDICINE

## 2024-04-23 PROCEDURE — 80053 COMPREHEN METABOLIC PANEL: CPT

## 2024-04-23 NOTE — PROGRESS NOTES
Chief Complaint   Patient presents with    Follow-up           Vitals:    04/23/24 1045   BP: 100/66   Pulse: 72   Resp: 18   Temp: 98.3 °F (36.8 °C)   SpO2: 96%      Patient had fall, was admitted to [facility] for bradycardia. Had pacemaker inserted. 2/21      1. Have you been to the ER, urgent care clinic since your last visit?  Hospitalized since your last visit?  Yes [facility] ER Fall  Admitted for bradycardia, pacemaker inserted  2. Have you seen or consulted any other health care providers outside of the [facility] since your last visit?  Include any pap smears or colon screening. Yes Cardiologist      
hold if platelet count is > 300.  -- MD visit in 12 weeks with labs in [facility].      PROMACTA DOSING:   - Use the lowest dose to achieve and maintain platelet count ?50,000/mm3 as needed to reduce the risk of bleeding. Discontinue eltrombopag if platelet count does not respond  to a level that avoids clinically important bleeding after 4 weeks at the maximum of 75 mg/day   - Dosage adjustment based on platelet response:   Platelet count <50,000/mm3 (?2 weeks after treatment initiation or a dose increase): Increase daily dose by 25 mg (if taking 12.5 mg once daily, increase dose to 25  mg once daily prior to increasing the dose amount by 25 mg daily); maximum: 75 mg/day.   -Platelet count ?200,000/mm3 and ?400,000/mm3 (at any time): Reduce daily dose by 25 mg (if taking 25 mg once daily, decrease dose to 12.5 mg once daily); reassess in 2 weeks.   -Platelet count >400,000/mm3: Withhold dose; assess platelet count twice weekly; when platelet count <150,000/mm3, resume with the daily dose reduced by 25 mg (if taking 25 mg once daily, resume with  12.5 mg once daily).   -Platelet count >400,000/mm3 after 2 weeks at the lowest dose: Discontinue treatment.     2. HTN:   Controlled on current medications.       3. Dementia:  Resides in [facility].     4. Bradycardia s/p pacemaker placement:  No further falls. HR now controlled.     5. Superficial venous thrombosis:  Occurred in R arm likely due to recent IV. Swelling has now resolved. Do not feel this was necessarily related to Promacta.     Signed By:  Isela Velasco MD

## 2024-05-15 ENCOUNTER — TELEPHONE (OUTPATIENT)
Age: 89
End: 2024-05-15

## 2024-05-15 NOTE — TELEPHONE ENCOUNTER
Cancelled 05/24 appt., LVM for Pt. To call and R/S Pacemaker and Appt. With NP, Rasheeda Dsouza.    ** Please make appointment.

## 2024-06-04 ENCOUNTER — HOSPITAL ENCOUNTER (OUTPATIENT)
Facility: HOSPITAL | Age: 89
Setting detail: INFUSION SERIES
Discharge: HOME | End: 2024-06-04
Payer: MEDICARE

## 2024-06-04 VITALS
DIASTOLIC BLOOD PRESSURE: 60 MMHG | OXYGEN SATURATION: 99 % | RESPIRATION RATE: 18 BRPM | HEART RATE: 66 BPM | WEIGHT: 186.7 LBS | TEMPERATURE: 98.2 F | BODY MASS INDEX: 25.32 KG/M2 | SYSTOLIC BLOOD PRESSURE: 130 MMHG

## 2024-06-04 DIAGNOSIS — D69.3 IDIOPATHIC THROMBOCYTOPENIC PURPURA (ITP) (HCC): ICD-10-CM

## 2024-06-04 LAB
ALBUMIN SERPL-MCNC: 3.5 G/DL (ref 3.5–5)
ALBUMIN/GLOB SERPL: 1.1 (ref 1.1–2.2)
ALP SERPL-CCNC: 133 U/L (ref 45–117)
ALT SERPL-CCNC: 16 U/L (ref 12–78)
ANION GAP SERPL CALC-SCNC: 3 MMOL/L (ref 5–15)
AST SERPL-CCNC: 17 U/L (ref 15–37)
BASOPHILS # BLD: 0 K/UL (ref 0–0.1)
BASOPHILS NFR BLD: 1 % (ref 0–1)
BILIRUB SERPL-MCNC: 0.6 MG/DL (ref 0.2–1)
BUN SERPL-MCNC: 20 MG/DL (ref 6–20)
BUN/CREAT SERPL: 21 (ref 12–20)
CALCIUM SERPL-MCNC: 9 MG/DL (ref 8.5–10.1)
CHLORIDE SERPL-SCNC: 107 MMOL/L (ref 97–108)
CO2 SERPL-SCNC: 30 MMOL/L (ref 21–32)
CREAT SERPL-MCNC: 0.97 MG/DL (ref 0.7–1.3)
DIFFERENTIAL METHOD BLD: NORMAL
EOSINOPHIL # BLD: 0.2 K/UL (ref 0–0.4)
EOSINOPHIL NFR BLD: 3 % (ref 0–7)
ERYTHROCYTE [DISTWIDTH] IN BLOOD BY AUTOMATED COUNT: 12.7 % (ref 11.5–14.5)
GLOBULIN SER CALC-MCNC: 3.3 G/DL (ref 2–4)
GLUCOSE SERPL-MCNC: 166 MG/DL (ref 65–100)
HCT VFR BLD AUTO: 38.7 % (ref 36.6–50.3)
HGB BLD-MCNC: 12.6 G/DL (ref 12.1–17)
IMM GRANULOCYTES # BLD AUTO: 0 K/UL (ref 0–0.04)
IMM GRANULOCYTES NFR BLD AUTO: 0 % (ref 0–0.5)
LYMPHOCYTES # BLD: 1.6 K/UL (ref 0.8–3.5)
LYMPHOCYTES NFR BLD: 25 % (ref 12–49)
MCH RBC QN AUTO: 30.2 PG (ref 26–34)
MCHC RBC AUTO-ENTMCNC: 32.6 G/DL (ref 30–36.5)
MCV RBC AUTO: 92.8 FL (ref 80–99)
MONOCYTES # BLD: 0.6 K/UL (ref 0–1)
MONOCYTES NFR BLD: 8 % (ref 5–13)
NEUTS SEG # BLD: 4.2 K/UL (ref 1.8–8)
NEUTS SEG NFR BLD: 63 % (ref 32–75)
NRBC # BLD: 0 K/UL (ref 0–0.01)
NRBC BLD-RTO: 0 PER 100 WBC
PLATELET # BLD AUTO: 212 K/UL (ref 150–400)
PMV BLD AUTO: 9.6 FL (ref 8.9–12.9)
POTASSIUM SERPL-SCNC: 4 MMOL/L (ref 3.5–5.1)
PROT SERPL-MCNC: 6.8 G/DL (ref 6.4–8.2)
RBC # BLD AUTO: 4.17 M/UL (ref 4.1–5.7)
SODIUM SERPL-SCNC: 140 MMOL/L (ref 136–145)
WBC # BLD AUTO: 6.7 K/UL (ref 4.1–11.1)

## 2024-06-04 PROCEDURE — 36415 COLL VENOUS BLD VENIPUNCTURE: CPT

## 2024-06-04 PROCEDURE — 85025 COMPLETE CBC W/AUTO DIFF WBC: CPT

## 2024-06-04 PROCEDURE — 80053 COMPREHEN METABOLIC PANEL: CPT

## 2024-06-24 RX ORDER — ELTROMBOPAG OLAMINE 12.5 MG/1
TABLET, FILM COATED ORAL
Qty: 30 TABLET | Refills: 5 | Status: ACTIVE | OUTPATIENT
Start: 2024-06-24

## 2024-06-25 PROCEDURE — 93294 REM INTERROG EVL PM/LDLS PM: CPT | Performed by: INTERNAL MEDICINE

## 2024-07-12 DIAGNOSIS — D69.3 IDIOPATHIC THROMBOCYTOPENIC PURPURA (ITP) (HCC): Primary | ICD-10-CM

## 2024-07-12 RX ORDER — HEPARIN SODIUM (PORCINE) LOCK FLUSH IV SOLN 100 UNIT/ML 100 UNIT/ML
500 SOLUTION INTRAVENOUS PRN
OUTPATIENT
Start: 2024-07-12

## 2024-07-12 RX ORDER — SODIUM CHLORIDE 0.9 % (FLUSH) 0.9 %
5-40 SYRINGE (ML) INJECTION PRN
OUTPATIENT
Start: 2024-07-12

## 2024-07-12 RX ORDER — SODIUM CHLORIDE 9 MG/ML
5-250 INJECTION, SOLUTION INTRAVENOUS PRN
OUTPATIENT
Start: 2024-07-12

## 2024-07-16 ENCOUNTER — HOSPITAL ENCOUNTER (OUTPATIENT)
Facility: HOSPITAL | Age: 89
Setting detail: INFUSION SERIES
Discharge: HOME | End: 2024-07-16
Payer: MEDICARE

## 2024-07-16 ENCOUNTER — OFFICE VISIT (OUTPATIENT)
Age: 89
End: 2024-07-16
Payer: MEDICARE

## 2024-07-16 VITALS
WEIGHT: 184.1 LBS | TEMPERATURE: 97.5 F | HEART RATE: 73 BPM | DIASTOLIC BLOOD PRESSURE: 54 MMHG | BODY MASS INDEX: 24.97 KG/M2 | OXYGEN SATURATION: 96 % | RESPIRATION RATE: 16 BRPM | SYSTOLIC BLOOD PRESSURE: 95 MMHG

## 2024-07-16 VITALS
HEIGHT: 72 IN | BODY MASS INDEX: 24.97 KG/M2 | SYSTOLIC BLOOD PRESSURE: 96 MMHG | HEART RATE: 78 BPM | OXYGEN SATURATION: 100 % | RESPIRATION RATE: 18 BRPM | DIASTOLIC BLOOD PRESSURE: 60 MMHG | TEMPERATURE: 98.1 F

## 2024-07-16 DIAGNOSIS — I10 ESSENTIAL (PRIMARY) HYPERTENSION: ICD-10-CM

## 2024-07-16 DIAGNOSIS — D69.3 IDIOPATHIC THROMBOCYTOPENIC PURPURA (ITP) (HCC): ICD-10-CM

## 2024-07-16 DIAGNOSIS — D69.3 IDIOPATHIC THROMBOCYTOPENIC PURPURA (ITP) (HCC): Primary | ICD-10-CM

## 2024-07-16 DIAGNOSIS — Z51.81 ENCOUNTER FOR THERAPEUTIC DRUG LEVEL MONITORING: ICD-10-CM

## 2024-07-16 DIAGNOSIS — Z79.899 OTHER LONG TERM (CURRENT) DRUG THERAPY: ICD-10-CM

## 2024-07-16 DIAGNOSIS — F03.B0 MODERATE DEMENTIA WITHOUT BEHAVIORAL DISTURBANCE, PSYCHOTIC DISTURBANCE, MOOD DISTURBANCE, OR ANXIETY, UNSPECIFIED DEMENTIA TYPE (HCC): ICD-10-CM

## 2024-07-16 LAB
ALBUMIN SERPL-MCNC: 3.3 G/DL (ref 3.5–5)
ALBUMIN/GLOB SERPL: 1 (ref 1.1–2.2)
ALP SERPL-CCNC: 122 U/L (ref 45–117)
ALT SERPL-CCNC: 18 U/L (ref 12–78)
ANION GAP SERPL CALC-SCNC: 5 MMOL/L (ref 5–15)
AST SERPL-CCNC: 16 U/L (ref 15–37)
BASOPHILS # BLD: 0 K/UL (ref 0–0.1)
BASOPHILS NFR BLD: 1 % (ref 0–1)
BILIRUB SERPL-MCNC: 0.6 MG/DL (ref 0.2–1)
BUN SERPL-MCNC: 16 MG/DL (ref 6–20)
BUN/CREAT SERPL: 14 (ref 12–20)
CALCIUM SERPL-MCNC: 8.7 MG/DL (ref 8.5–10.1)
CHLORIDE SERPL-SCNC: 106 MMOL/L (ref 97–108)
CO2 SERPL-SCNC: 28 MMOL/L (ref 21–32)
CREAT SERPL-MCNC: 1.12 MG/DL (ref 0.7–1.3)
DIFFERENTIAL METHOD BLD: NORMAL
EOSINOPHIL # BLD: 0.1 K/UL (ref 0–0.4)
EOSINOPHIL NFR BLD: 2 % (ref 0–7)
ERYTHROCYTE [DISTWIDTH] IN BLOOD BY AUTOMATED COUNT: 12.5 % (ref 11.5–14.5)
GLOBULIN SER CALC-MCNC: 3.4 G/DL (ref 2–4)
GLUCOSE SERPL-MCNC: 192 MG/DL (ref 65–100)
HCT VFR BLD AUTO: 37.7 % (ref 36.6–50.3)
HGB BLD-MCNC: 12.4 G/DL (ref 12.1–17)
IMM GRANULOCYTES # BLD AUTO: 0 K/UL (ref 0–0.04)
IMM GRANULOCYTES NFR BLD AUTO: 0 % (ref 0–0.5)
LYMPHOCYTES # BLD: 1.5 K/UL (ref 0.8–3.5)
LYMPHOCYTES NFR BLD: 20 % (ref 12–49)
MCH RBC QN AUTO: 29.9 PG (ref 26–34)
MCHC RBC AUTO-ENTMCNC: 32.9 G/DL (ref 30–36.5)
MCV RBC AUTO: 90.8 FL (ref 80–99)
MONOCYTES # BLD: 0.5 K/UL (ref 0–1)
MONOCYTES NFR BLD: 7 % (ref 5–13)
NEUTS SEG # BLD: 5.3 K/UL (ref 1.8–8)
NEUTS SEG NFR BLD: 70 % (ref 32–75)
NRBC # BLD: 0 K/UL (ref 0–0.01)
NRBC BLD-RTO: 0 PER 100 WBC
PLATELET # BLD AUTO: 251 K/UL (ref 150–400)
PMV BLD AUTO: 9.6 FL (ref 8.9–12.9)
POTASSIUM SERPL-SCNC: 4.3 MMOL/L (ref 3.5–5.1)
PROT SERPL-MCNC: 6.7 G/DL (ref 6.4–8.2)
RBC # BLD AUTO: 4.15 M/UL (ref 4.1–5.7)
SODIUM SERPL-SCNC: 139 MMOL/L (ref 136–145)
WBC # BLD AUTO: 7.5 K/UL (ref 4.1–11.1)

## 2024-07-16 PROCEDURE — G8427 DOCREV CUR MEDS BY ELIG CLIN: HCPCS | Performed by: INTERNAL MEDICINE

## 2024-07-16 PROCEDURE — 99214 OFFICE O/P EST MOD 30 MIN: CPT | Performed by: INTERNAL MEDICINE

## 2024-07-16 PROCEDURE — 80053 COMPREHEN METABOLIC PANEL: CPT

## 2024-07-16 PROCEDURE — 1123F ACP DISCUSS/DSCN MKR DOCD: CPT | Performed by: INTERNAL MEDICINE

## 2024-07-16 PROCEDURE — 1036F TOBACCO NON-USER: CPT | Performed by: INTERNAL MEDICINE

## 2024-07-16 PROCEDURE — 36415 COLL VENOUS BLD VENIPUNCTURE: CPT

## 2024-07-16 PROCEDURE — G8420 CALC BMI NORM PARAMETERS: HCPCS | Performed by: INTERNAL MEDICINE

## 2024-07-16 PROCEDURE — 85025 COMPLETE CBC W/AUTO DIFF WBC: CPT

## 2024-07-16 NOTE — PROGRESS NOTES
[facility] [place] [facility]   [address] [address], [address] [facility] [address]   W: [phone]  F: [phone]     Reason for Visit:   Naldo Polanco Jr is a 89 y.o. male who is seen for follow up of ITP.     Hematology Oncology Treatment History:       Diagnosis: ITP     Pathology:   2/1/23 Bone marrow biopsy: Normocellular marrow with maturing trilineage hematopoiesis including   mildly left shifted granulopoiesis  without increase in blasts, see comment.   Flow cytometry shows no diagnostic immunophenotypic abnormalities   Peripheral Blood:   Normochromic, normocytic anemia with no increase in schistocytes   Thrombocytopenia. White blood cells normal in number and morphology   CYTOGENETICS - Karyotype:45,X,-Y[16]/46,XY[4]; Interpretation: ABNORMAL MALE KARYOTYPE      Prior Treatment:   Dexamethasone 9/2022 with good response  Dex 40mg/d x 4 and IVIG on 1/18/23 and 1/19/23     Current Treatment: Promacta 50mg/d started 2/23/23, decreased to Promacta 25mg 3/14/23 and decreased to Promacta 12.5mg on 3/28/23.         History of Present Illness:   Naldo Polanco Jr is a pleasant 89 y.o. male with dementia who is seen for follow up of ITP. Pt ambulates without assistance. He lives at [facility]; has been there since Sept 2022.   Has 2 daughters. 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 The patient has been under observation and treatment for this condition, and today's visit is to assess his current status and adjust treatment as necessary.  Denies any bruising, bleeding.   Accompanied by daughter, Barb.         PMHX: BPH, Dementia, ED, HTN, Hydronephrosis, HLD, Right fascicular block  PSurgHx: None  SHX: , has significant other currently. Lives in [facility].

## 2024-07-16 NOTE — PROGRESS NOTES
Chief Complaint   Patient presents with    Follow-up           Vitals:    07/16/24 1010   BP: 96/60   Pulse: 78   Resp: 18   Temp: 98.1 °F (36.7 °C)   SpO2: 100%      Patient had fall last week, has large skin tear on L arm covered w steri strip. Appears to be healing      1. Have you been to the ER, urgent care clinic since your last visit?  Hospitalized since your last visit?  No  2. Have you seen or consulted any other health care providers outside of the [facility] since your last visit?  Include any pap smears or colon screening. No

## 2024-08-27 ENCOUNTER — HOSPITAL ENCOUNTER (OUTPATIENT)
Facility: HOSPITAL | Age: 89
Setting detail: INFUSION SERIES
Discharge: HOME | End: 2024-08-27
Payer: MEDICARE

## 2024-08-27 VITALS
DIASTOLIC BLOOD PRESSURE: 70 MMHG | HEART RATE: 76 BPM | HEIGHT: 72 IN | SYSTOLIC BLOOD PRESSURE: 149 MMHG | BODY MASS INDEX: 25.12 KG/M2 | RESPIRATION RATE: 14 BRPM | TEMPERATURE: 97.4 F | WEIGHT: 185.5 LBS | OXYGEN SATURATION: 96 %

## 2024-08-27 DIAGNOSIS — D69.3 IDIOPATHIC THROMBOCYTOPENIC PURPURA (ITP) (HCC): ICD-10-CM

## 2024-08-27 LAB
ALBUMIN SERPL-MCNC: 3.6 G/DL (ref 3.5–5)
ALBUMIN/GLOB SERPL: 1.1 (ref 1.1–2.2)
ALP SERPL-CCNC: 129 U/L (ref 45–117)
ALT SERPL-CCNC: 28 U/L (ref 12–78)
ANION GAP SERPL CALC-SCNC: 2 MMOL/L (ref 5–15)
AST SERPL-CCNC: 23 U/L (ref 15–37)
BASOPHILS # BLD: 0.1 K/UL (ref 0–0.1)
BASOPHILS NFR BLD: 1 % (ref 0–1)
BILIRUB SERPL-MCNC: 0.6 MG/DL (ref 0.2–1)
BUN SERPL-MCNC: 18 MG/DL (ref 6–20)
BUN/CREAT SERPL: 19 (ref 12–20)
CALCIUM SERPL-MCNC: 8.7 MG/DL (ref 8.5–10.1)
CHLORIDE SERPL-SCNC: 105 MMOL/L (ref 97–108)
CO2 SERPL-SCNC: 32 MMOL/L (ref 21–32)
CREAT SERPL-MCNC: 0.93 MG/DL (ref 0.7–1.3)
DIFFERENTIAL METHOD BLD: NORMAL
EOSINOPHIL # BLD: 0.2 K/UL (ref 0–0.4)
EOSINOPHIL NFR BLD: 3 % (ref 0–7)
ERYTHROCYTE [DISTWIDTH] IN BLOOD BY AUTOMATED COUNT: 12.5 % (ref 11.5–14.5)
GLOBULIN SER CALC-MCNC: 3.3 G/DL (ref 2–4)
GLUCOSE SERPL-MCNC: 152 MG/DL (ref 65–100)
HCT VFR BLD AUTO: 38.2 % (ref 36.6–50.3)
HGB BLD-MCNC: 13 G/DL (ref 12.1–17)
IMM GRANULOCYTES # BLD AUTO: 0 K/UL (ref 0–0.04)
IMM GRANULOCYTES NFR BLD AUTO: 0 % (ref 0–0.5)
LYMPHOCYTES # BLD: 2 K/UL (ref 0.8–3.5)
LYMPHOCYTES NFR BLD: 23 % (ref 12–49)
MCH RBC QN AUTO: 30.4 PG (ref 26–34)
MCHC RBC AUTO-ENTMCNC: 34 G/DL (ref 30–36.5)
MCV RBC AUTO: 89.5 FL (ref 80–99)
MONOCYTES # BLD: 0.6 K/UL (ref 0–1)
MONOCYTES NFR BLD: 7 % (ref 5–13)
NEUTS SEG # BLD: 5.8 K/UL (ref 1.8–8)
NEUTS SEG NFR BLD: 66 % (ref 32–75)
NRBC # BLD: 0 K/UL (ref 0–0.01)
NRBC BLD-RTO: 0 PER 100 WBC
PLATELET # BLD AUTO: 296 K/UL (ref 150–400)
PMV BLD AUTO: 9.5 FL (ref 8.9–12.9)
POTASSIUM SERPL-SCNC: 3.8 MMOL/L (ref 3.5–5.1)
PROT SERPL-MCNC: 6.9 G/DL (ref 6.4–8.2)
RBC # BLD AUTO: 4.27 M/UL (ref 4.1–5.7)
SODIUM SERPL-SCNC: 139 MMOL/L (ref 136–145)
WBC # BLD AUTO: 8.7 K/UL (ref 4.1–11.1)

## 2024-08-27 PROCEDURE — 80053 COMPREHEN METABOLIC PANEL: CPT

## 2024-08-27 PROCEDURE — 36415 COLL VENOUS BLD VENIPUNCTURE: CPT

## 2024-08-27 PROCEDURE — 85025 COMPLETE CBC W/AUTO DIFF WBC: CPT

## 2024-09-24 PROCEDURE — 93294 REM INTERROG EVL PM/LDLS PM: CPT | Performed by: INTERNAL MEDICINE

## 2024-10-08 ENCOUNTER — HOSPITAL ENCOUNTER (OUTPATIENT)
Facility: HOSPITAL | Age: 89
Setting detail: INFUSION SERIES
Discharge: HOME | End: 2024-10-08
Payer: MEDICARE

## 2024-10-08 ENCOUNTER — OFFICE VISIT (OUTPATIENT)
Age: 89
End: 2024-10-08
Payer: MEDICARE

## 2024-10-08 VITALS
SYSTOLIC BLOOD PRESSURE: 161 MMHG | WEIGHT: 189.2 LBS | HEIGHT: 72 IN | BODY MASS INDEX: 25.63 KG/M2 | TEMPERATURE: 97.6 F | RESPIRATION RATE: 16 BRPM | OXYGEN SATURATION: 97 % | HEART RATE: 83 BPM | DIASTOLIC BLOOD PRESSURE: 84 MMHG

## 2024-10-08 VITALS
TEMPERATURE: 97.6 F | HEART RATE: 80 BPM | DIASTOLIC BLOOD PRESSURE: 78 MMHG | RESPIRATION RATE: 16 BRPM | SYSTOLIC BLOOD PRESSURE: 156 MMHG | WEIGHT: 183.9 LBS | BODY MASS INDEX: 24.94 KG/M2 | OXYGEN SATURATION: 99 %

## 2024-10-08 DIAGNOSIS — Z86.718 HISTORY OF THROMBOSIS: ICD-10-CM

## 2024-10-08 DIAGNOSIS — D69.3 IDIOPATHIC THROMBOCYTOPENIC PURPURA (ITP) (HCC): Primary | ICD-10-CM

## 2024-10-08 DIAGNOSIS — Z51.81 ENCOUNTER FOR THERAPEUTIC DRUG LEVEL MONITORING: ICD-10-CM

## 2024-10-08 DIAGNOSIS — D69.3 IDIOPATHIC THROMBOCYTOPENIC PURPURA (ITP) (HCC): ICD-10-CM

## 2024-10-08 DIAGNOSIS — F03.B0 MODERATE DEMENTIA WITHOUT BEHAVIORAL DISTURBANCE, PSYCHOTIC DISTURBANCE, MOOD DISTURBANCE, OR ANXIETY, UNSPECIFIED DEMENTIA TYPE (HCC): ICD-10-CM

## 2024-10-08 DIAGNOSIS — I10 ESSENTIAL (PRIMARY) HYPERTENSION: ICD-10-CM

## 2024-10-08 LAB
ALBUMIN SERPL-MCNC: 3.7 G/DL (ref 3.5–5)
ALBUMIN/GLOB SERPL: 1.1 (ref 1.1–2.2)
ALP SERPL-CCNC: 118 U/L (ref 45–117)
ALT SERPL-CCNC: 21 U/L (ref 12–78)
ANION GAP SERPL CALC-SCNC: 1 MMOL/L (ref 2–12)
AST SERPL-CCNC: 20 U/L (ref 15–37)
BASOPHILS # BLD: 0 K/UL (ref 0–0.1)
BASOPHILS NFR BLD: 0 % (ref 0–1)
BILIRUB SERPL-MCNC: 0.9 MG/DL (ref 0.2–1)
BUN SERPL-MCNC: 15 MG/DL (ref 6–20)
BUN/CREAT SERPL: 16 (ref 12–20)
CALCIUM SERPL-MCNC: 9.2 MG/DL (ref 8.5–10.1)
CHLORIDE SERPL-SCNC: 106 MMOL/L (ref 97–108)
CO2 SERPL-SCNC: 32 MMOL/L (ref 21–32)
CREAT SERPL-MCNC: 0.94 MG/DL (ref 0.7–1.3)
DIFFERENTIAL METHOD BLD: NORMAL
EOSINOPHIL # BLD: 0.2 K/UL (ref 0–0.4)
EOSINOPHIL NFR BLD: 3 % (ref 0–7)
ERYTHROCYTE [DISTWIDTH] IN BLOOD BY AUTOMATED COUNT: 12.3 % (ref 11.5–14.5)
GLOBULIN SER CALC-MCNC: 3.4 G/DL (ref 2–4)
GLUCOSE SERPL-MCNC: 117 MG/DL (ref 65–100)
HCT VFR BLD AUTO: 40.8 % (ref 36.6–50.3)
HGB BLD-MCNC: 13.1 G/DL (ref 12.1–17)
IMM GRANULOCYTES # BLD AUTO: 0 K/UL (ref 0–0.04)
IMM GRANULOCYTES NFR BLD AUTO: 0 % (ref 0–0.5)
LYMPHOCYTES # BLD: 2.2 K/UL (ref 0.8–3.5)
LYMPHOCYTES NFR BLD: 30 % (ref 12–49)
MCH RBC QN AUTO: 30 PG (ref 26–34)
MCHC RBC AUTO-ENTMCNC: 32.1 G/DL (ref 30–36.5)
MCV RBC AUTO: 93.4 FL (ref 80–99)
MONOCYTES # BLD: 0.7 K/UL (ref 0–1)
MONOCYTES NFR BLD: 9 % (ref 5–13)
NEUTS SEG # BLD: 4.2 K/UL (ref 1.8–8)
NEUTS SEG NFR BLD: 58 % (ref 32–75)
NRBC # BLD: 0 K/UL (ref 0–0.01)
NRBC BLD-RTO: 0 PER 100 WBC
PLATELET # BLD AUTO: 250 K/UL (ref 150–400)
PMV BLD AUTO: 9.8 FL (ref 8.9–12.9)
POTASSIUM SERPL-SCNC: 4 MMOL/L (ref 3.5–5.1)
PROT SERPL-MCNC: 7.1 G/DL (ref 6.4–8.2)
RBC # BLD AUTO: 4.37 M/UL (ref 4.1–5.7)
SODIUM SERPL-SCNC: 139 MMOL/L (ref 136–145)
WBC # BLD AUTO: 7.3 K/UL (ref 4.1–11.1)

## 2024-10-08 PROCEDURE — 99215 OFFICE O/P EST HI 40 MIN: CPT | Performed by: INTERNAL MEDICINE

## 2024-10-08 PROCEDURE — G8427 DOCREV CUR MEDS BY ELIG CLIN: HCPCS | Performed by: INTERNAL MEDICINE

## 2024-10-08 PROCEDURE — 80053 COMPREHEN METABOLIC PANEL: CPT

## 2024-10-08 PROCEDURE — 1036F TOBACCO NON-USER: CPT | Performed by: INTERNAL MEDICINE

## 2024-10-08 PROCEDURE — 36415 COLL VENOUS BLD VENIPUNCTURE: CPT

## 2024-10-08 PROCEDURE — 1123F ACP DISCUSS/DSCN MKR DOCD: CPT | Performed by: INTERNAL MEDICINE

## 2024-10-08 PROCEDURE — G8419 CALC BMI OUT NRM PARAM NOF/U: HCPCS | Performed by: INTERNAL MEDICINE

## 2024-10-08 PROCEDURE — G8484 FLU IMMUNIZE NO ADMIN: HCPCS | Performed by: INTERNAL MEDICINE

## 2024-10-08 PROCEDURE — 85025 COMPLETE CBC W/AUTO DIFF WBC: CPT

## 2024-10-08 NOTE — PROGRESS NOTES
Chief Complaint   Patient presents with    Follow-up           Vitals:    10/08/24 0942   BP: (!) 161/84   Pulse: 83   Resp: 16   Temp: 97.6 °F (36.4 °C)   SpO2: 97%            1. Have you been to the ER, urgent care clinic since your last visit?  Hospitalized since your last visit?  No  2. Have you seen or consulted any other health care providers outside of the [facility] since your last visit?  Include any pap smears or colon screening. No    
Reviewed  Review of Systems: A complete review of systems was obtained, reviewed.  Pertinent findings reviewed above.        Physical Exam:   BP (!) 161/84 (Site: Right Upper Arm, Position: Sitting, Cuff Size: Medium Adult)   Pulse 83   Temp 97.6 °F (36.4 °C) (Temporal)   Resp 16   Ht 1.829 m (6')   Wt 85.8 kg (189 lb 3.2 oz)   SpO2 97%   BMI 25.66 kg/m²   ECOG PS: 2-3  General: no distress  Eyes: anicteric sclerae  HENT: oropharynx clear  Neck: supple  Lymphatic: deferred today   Respiratory: normal respiratory effort, CTAB  GI: soft, nontender, nondistended, no masses  Skin: no petechiae, rashes. Scattered ecchymosis noted on bilateral arms with dry skin.    Neuro: limited judgement and insight due to dementia.     Results:       Lab Results   Component Value Date    WBC 7.3 10/08/2024    HGB 13.1 10/08/2024    HCT 40.8 10/08/2024     10/08/2024    MCV 93.4 10/08/2024    NEUTROABS 4.2 10/08/2024     Lab Results   Component Value Date     10/08/2024    K 4.0 10/08/2024     10/08/2024    CO2 32 10/08/2024    GLUCOSE 117 (H) 10/08/2024    BUN 15 10/08/2024    CREATININE 0.94 10/08/2024    LABGLOM 78 10/08/2024    CALCIUM 9.2 10/08/2024    MG 2.2 02/22/2024    PHOS 2.4 (L) 02/22/2024     Lab Results   Component Value Date    BILITOT 0.9 10/08/2024    ALT 21 10/08/2024    AST 20 10/08/2024    ALKPHOS 118 (H) 10/08/2024    GLOB 3.4 10/08/2024        1/4/2023 Peripheral Smear     Peripheral blood, smear:        Pancytopenia with absolute neutropenia and normocytic anemia (see   comment)   No circulating blasts, increase in schistocytes,  or platelet clumping   identified    Pancytopenia has many potential contributing etiologies including   nutritional deficiency, medication effect, renal disease, and underlying   hematologic process such as myelodysplastic syndrome. Clinical correlation   is recommended; should  cytopenias persist with no apparent explanation,   further evaluation including

## 2024-11-19 ENCOUNTER — HOSPITAL ENCOUNTER (OUTPATIENT)
Facility: HOSPITAL | Age: 89
Setting detail: INFUSION SERIES
Discharge: HOME | End: 2024-11-19
Payer: MEDICARE

## 2024-11-19 VITALS
TEMPERATURE: 97.6 F | OXYGEN SATURATION: 97 % | HEART RATE: 72 BPM | DIASTOLIC BLOOD PRESSURE: 57 MMHG | SYSTOLIC BLOOD PRESSURE: 118 MMHG | RESPIRATION RATE: 16 BRPM

## 2024-11-19 DIAGNOSIS — D69.3 IDIOPATHIC THROMBOCYTOPENIC PURPURA (ITP) (HCC): ICD-10-CM

## 2024-11-19 LAB
ALBUMIN SERPL-MCNC: 3.6 G/DL (ref 3.5–5)
ALBUMIN/GLOB SERPL: 1.2 (ref 1.1–2.2)
ALP SERPL-CCNC: 130 U/L (ref 45–117)
ALT SERPL-CCNC: 18 U/L (ref 12–78)
ANION GAP SERPL CALC-SCNC: 2 MMOL/L (ref 2–12)
AST SERPL-CCNC: 15 U/L (ref 15–37)
BASOPHILS # BLD: 0 K/UL (ref 0–0.1)
BASOPHILS NFR BLD: 0 % (ref 0–1)
BILIRUB SERPL-MCNC: 0.9 MG/DL (ref 0.2–1)
BUN SERPL-MCNC: 18 MG/DL (ref 6–20)
BUN/CREAT SERPL: 18 (ref 12–20)
CALCIUM SERPL-MCNC: 8.4 MG/DL (ref 8.5–10.1)
CHLORIDE SERPL-SCNC: 106 MMOL/L (ref 97–108)
CO2 SERPL-SCNC: 31 MMOL/L (ref 21–32)
CREAT SERPL-MCNC: 0.98 MG/DL (ref 0.7–1.3)
DIFFERENTIAL METHOD BLD: ABNORMAL
EOSINOPHIL # BLD: 0.1 K/UL (ref 0–0.4)
EOSINOPHIL NFR BLD: 2 % (ref 0–7)
ERYTHROCYTE [DISTWIDTH] IN BLOOD BY AUTOMATED COUNT: 12.3 % (ref 11.5–14.5)
GLOBULIN SER CALC-MCNC: 3 G/DL (ref 2–4)
GLUCOSE SERPL-MCNC: 149 MG/DL (ref 65–100)
HCT VFR BLD AUTO: 37.2 % (ref 36.6–50.3)
HGB BLD-MCNC: 12.2 G/DL (ref 12.1–17)
IMM GRANULOCYTES # BLD AUTO: 0 K/UL (ref 0–0.04)
IMM GRANULOCYTES NFR BLD AUTO: 0 % (ref 0–0.5)
LYMPHOCYTES # BLD: 1.6 K/UL (ref 0.8–3.5)
LYMPHOCYTES NFR BLD: 18 % (ref 12–49)
MCH RBC QN AUTO: 30.4 PG (ref 26–34)
MCHC RBC AUTO-ENTMCNC: 32.8 G/DL (ref 30–36.5)
MCV RBC AUTO: 92.8 FL (ref 80–99)
MONOCYTES # BLD: 0.7 K/UL (ref 0–1)
MONOCYTES NFR BLD: 8 % (ref 5–13)
NEUTS SEG # BLD: 6.5 K/UL (ref 1.8–8)
NEUTS SEG NFR BLD: 72 % (ref 32–75)
NRBC # BLD: 0 K/UL (ref 0–0.01)
NRBC BLD-RTO: 0 PER 100 WBC
PLATELET # BLD AUTO: 245 K/UL (ref 150–400)
PMV BLD AUTO: 9.7 FL (ref 8.9–12.9)
POTASSIUM SERPL-SCNC: 3.8 MMOL/L (ref 3.5–5.1)
PROT SERPL-MCNC: 6.6 G/DL (ref 6.4–8.2)
RBC # BLD AUTO: 4.01 M/UL (ref 4.1–5.7)
SODIUM SERPL-SCNC: 139 MMOL/L (ref 136–145)
WBC # BLD AUTO: 9 K/UL (ref 4.1–11.1)

## 2024-11-19 PROCEDURE — 80053 COMPREHEN METABOLIC PANEL: CPT

## 2024-11-19 PROCEDURE — 36415 COLL VENOUS BLD VENIPUNCTURE: CPT

## 2024-11-19 PROCEDURE — 85025 COMPLETE CBC W/AUTO DIFF WBC: CPT

## 2024-11-22 DIAGNOSIS — D69.3 IDIOPATHIC THROMBOCYTOPENIC PURPURA (ITP) (HCC): Primary | ICD-10-CM

## 2024-11-22 RX ORDER — SODIUM CHLORIDE 9 MG/ML
5-250 INJECTION, SOLUTION INTRAVENOUS PRN
OUTPATIENT
Start: 2024-11-22

## 2024-11-22 RX ORDER — SODIUM CHLORIDE 0.9 % (FLUSH) 0.9 %
5-40 SYRINGE (ML) INJECTION PRN
OUTPATIENT
Start: 2024-11-22

## 2024-11-22 RX ORDER — HEPARIN SODIUM (PORCINE) LOCK FLUSH IV SOLN 100 UNIT/ML 100 UNIT/ML
500 SOLUTION INTRAVENOUS PRN
OUTPATIENT
Start: 2024-11-22

## 2024-12-02 RX ORDER — ELTROMBOPAG OLAMINE 12.5 MG/1
TABLET, FILM COATED ORAL
Qty: 30 TABLET | Refills: 5 | Status: ACTIVE | OUTPATIENT
Start: 2024-12-02

## 2025-01-06 ENCOUNTER — TELEPHONE (OUTPATIENT)
Age: 89
End: 2025-01-06

## 2025-01-06 NOTE — TELEPHONE ENCOUNTER
LM with patient to let him know he may still attend his [facility] appointment if he would like but we have canceled his office visit due to the inclement weather and we will call back to reschedule, gave him office number to call back with any questions.

## 2025-01-17 ENCOUNTER — OFFICE VISIT (OUTPATIENT)
Age: 89
End: 2025-01-17
Payer: MEDICARE

## 2025-01-17 ENCOUNTER — HOSPITAL ENCOUNTER (OUTPATIENT)
Facility: HOSPITAL | Age: 89
Setting detail: INFUSION SERIES
Discharge: HOME | End: 2025-01-17
Payer: MEDICARE

## 2025-01-17 VITALS
TEMPERATURE: 98.1 F | DIASTOLIC BLOOD PRESSURE: 76 MMHG | RESPIRATION RATE: 16 BRPM | OXYGEN SATURATION: 98 % | WEIGHT: 181.1 LBS | BODY MASS INDEX: 24.56 KG/M2 | HEART RATE: 70 BPM | SYSTOLIC BLOOD PRESSURE: 124 MMHG

## 2025-01-17 VITALS
SYSTOLIC BLOOD PRESSURE: 126 MMHG | DIASTOLIC BLOOD PRESSURE: 80 MMHG | WEIGHT: 183 LBS | HEIGHT: 72 IN | HEART RATE: 74 BPM | BODY MASS INDEX: 24.79 KG/M2 | RESPIRATION RATE: 16 BRPM | TEMPERATURE: 98 F | OXYGEN SATURATION: 97 %

## 2025-01-17 DIAGNOSIS — D69.3 IDIOPATHIC THROMBOCYTOPENIC PURPURA (ITP) (HCC): Primary | ICD-10-CM

## 2025-01-17 DIAGNOSIS — D69.3 IDIOPATHIC THROMBOCYTOPENIC PURPURA (ITP) (HCC): ICD-10-CM

## 2025-01-17 DIAGNOSIS — F03.B0 MODERATE DEMENTIA WITHOUT BEHAVIORAL DISTURBANCE, PSYCHOTIC DISTURBANCE, MOOD DISTURBANCE, OR ANXIETY, UNSPECIFIED DEMENTIA TYPE (HCC): ICD-10-CM

## 2025-01-17 DIAGNOSIS — I10 ESSENTIAL (PRIMARY) HYPERTENSION: ICD-10-CM

## 2025-01-17 DIAGNOSIS — Z51.81 ENCOUNTER FOR THERAPEUTIC DRUG LEVEL MONITORING: ICD-10-CM

## 2025-01-17 LAB
ALBUMIN SERPL-MCNC: 3.3 G/DL (ref 3.5–5)
ALBUMIN/GLOB SERPL: 1.1 (ref 1.1–2.2)
ALP SERPL-CCNC: 111 U/L (ref 45–117)
ALT SERPL-CCNC: 20 U/L (ref 12–78)
ANION GAP SERPL CALC-SCNC: 4 MMOL/L (ref 2–12)
AST SERPL-CCNC: 20 U/L (ref 15–37)
BASOPHILS # BLD: 0.05 K/UL (ref 0–0.1)
BASOPHILS NFR BLD: 0.7 % (ref 0–1)
BILIRUB SERPL-MCNC: 0.4 MG/DL (ref 0.2–1)
BUN SERPL-MCNC: 19 MG/DL (ref 6–20)
BUN/CREAT SERPL: 20 (ref 12–20)
CALCIUM SERPL-MCNC: 8.5 MG/DL (ref 8.5–10.1)
CHLORIDE SERPL-SCNC: 108 MMOL/L (ref 97–108)
CO2 SERPL-SCNC: 29 MMOL/L (ref 21–32)
CREAT SERPL-MCNC: 0.95 MG/DL (ref 0.7–1.3)
DIFFERENTIAL METHOD BLD: ABNORMAL
EOSINOPHIL # BLD: 0.18 K/UL (ref 0–0.4)
EOSINOPHIL NFR BLD: 2.5 % (ref 0–7)
ERYTHROCYTE [DISTWIDTH] IN BLOOD BY AUTOMATED COUNT: 12.6 % (ref 11.5–14.5)
GLOBULIN SER CALC-MCNC: 3.1 G/DL (ref 2–4)
GLUCOSE SERPL-MCNC: 125 MG/DL (ref 65–100)
HCT VFR BLD AUTO: 36.1 % (ref 36.6–50.3)
HGB BLD-MCNC: 11.9 G/DL (ref 12.1–17)
IMM GRANULOCYTES # BLD AUTO: 0.01 K/UL (ref 0–0.04)
IMM GRANULOCYTES NFR BLD AUTO: 0.1 % (ref 0–0.5)
LYMPHOCYTES # BLD: 2.24 K/UL (ref 0.8–3.5)
LYMPHOCYTES NFR BLD: 31.1 % (ref 12–49)
MCH RBC QN AUTO: 29.8 PG (ref 26–34)
MCHC RBC AUTO-ENTMCNC: 33 G/DL (ref 30–36.5)
MCV RBC AUTO: 90.5 FL (ref 80–99)
MONOCYTES # BLD: 0.71 K/UL (ref 0–1)
MONOCYTES NFR BLD: 9.9 % (ref 5–13)
NEUTS SEG # BLD: 4.01 K/UL (ref 1.8–8)
NEUTS SEG NFR BLD: 55.7 % (ref 32–75)
NRBC # BLD: 0 K/UL (ref 0–0.01)
NRBC BLD-RTO: 0 PER 100 WBC
PLATELET # BLD AUTO: 219 K/UL (ref 150–400)
PMV BLD AUTO: 9.5 FL (ref 8.9–12.9)
POTASSIUM SERPL-SCNC: 3.7 MMOL/L (ref 3.5–5.1)
PROT SERPL-MCNC: 6.4 G/DL (ref 6.4–8.2)
RBC # BLD AUTO: 3.99 M/UL (ref 4.1–5.7)
SODIUM SERPL-SCNC: 141 MMOL/L (ref 136–145)
WBC # BLD AUTO: 7.2 K/UL (ref 4.1–11.1)

## 2025-01-17 PROCEDURE — 80053 COMPREHEN METABOLIC PANEL: CPT

## 2025-01-17 PROCEDURE — 1159F MED LIST DOCD IN RCRD: CPT | Performed by: INTERNAL MEDICINE

## 2025-01-17 PROCEDURE — G8427 DOCREV CUR MEDS BY ELIG CLIN: HCPCS | Performed by: INTERNAL MEDICINE

## 2025-01-17 PROCEDURE — 36415 COLL VENOUS BLD VENIPUNCTURE: CPT

## 2025-01-17 PROCEDURE — 99214 OFFICE O/P EST MOD 30 MIN: CPT | Performed by: INTERNAL MEDICINE

## 2025-01-17 PROCEDURE — G8420 CALC BMI NORM PARAMETERS: HCPCS | Performed by: INTERNAL MEDICINE

## 2025-01-17 PROCEDURE — 1036F TOBACCO NON-USER: CPT | Performed by: INTERNAL MEDICINE

## 2025-01-17 PROCEDURE — 85025 COMPLETE CBC W/AUTO DIFF WBC: CPT

## 2025-01-17 PROCEDURE — 1126F AMNT PAIN NOTED NONE PRSNT: CPT | Performed by: INTERNAL MEDICINE

## 2025-01-17 PROCEDURE — 1123F ACP DISCUSS/DSCN MKR DOCD: CPT | Performed by: INTERNAL MEDICINE

## 2025-01-17 NOTE — PROGRESS NOTES
Naldo ROS Polanco Jr is a 89 y.o. male follow up for         1. Have you been to the ER, urgent care clinic since your last visit?  Hospitalized since your last visit? No    2. Have you seen or consulted any other health care providers outside of the [facility] since your last visit?  Include any pap smears or colon screening. No  
complete review of systems was obtained, reviewed.  Pertinent findings reviewed above.        Physical Exam:   /80 (Site: Right Upper Arm, Position: Sitting, Cuff Size: Medium Adult)   Pulse 74   Temp 98 °F (36.7 °C) (Temporal)   Resp 16   Ht 1.829 m (6')   Wt 83 kg (183 lb)   SpO2 97%   BMI 24.82 kg/m²   ECOG PS: 2-3  General: no distress  Eyes: anicteric sclerae  HENT: oropharynx clear  Neck: supple  Lymphatic: deferred today   Respiratory: normal respiratory effort, CTAB  GI: soft, nontender, nondistended, no masses  Skin: no petechiae, rashes. Scattered ecchymosis noted on bilateral arms with dry skin.    Neuro: limited judgement and insight due to dementia.     Results:       Lab Results   Component Value Date    WBC 9.0 11/19/2024    HGB 12.2 11/19/2024    HCT 37.2 11/19/2024     11/19/2024    MCV 92.8 11/19/2024    NEUTROABS 6.5 11/19/2024     Lab Results   Component Value Date     11/19/2024    K 3.8 11/19/2024     11/19/2024    CO2 31 11/19/2024    GLUCOSE 149 (H) 11/19/2024    BUN 18 11/19/2024    CREATININE 0.98 11/19/2024    LABGLOM 75 11/19/2024    CALCIUM 8.4 (L) 11/19/2024    MG 2.2 02/22/2024    PHOS 2.4 (L) 02/22/2024     Lab Results   Component Value Date    BILITOT 0.9 11/19/2024    ALT 18 11/19/2024    AST 15 11/19/2024    ALKPHOS 130 (H) 11/19/2024    GLOB 3.0 11/19/2024 1/4/2023 Peripheral Smear     Peripheral blood, smear:        Pancytopenia with absolute neutropenia and normocytic anemia (see   comment)   No circulating blasts, increase in schistocytes,  or platelet clumping   identified    Pancytopenia has many potential contributing etiologies including   nutritional deficiency, medication effect, renal disease, and underlying   hematologic process such as myelodysplastic syndrome. Clinical correlation   is recommended; should  cytopenias persist with no apparent explanation,   further evaluation including potential bone marrow biopsy may be

## 2025-02-17 ENCOUNTER — APPOINTMENT (OUTPATIENT)
Facility: HOSPITAL | Age: 89
End: 2025-02-17
Payer: MEDICARE

## 2025-02-28 ENCOUNTER — TELEPHONE (OUTPATIENT)
Age: 89
End: 2025-02-28

## 2025-04-11 ENCOUNTER — OFFICE VISIT (OUTPATIENT)
Age: 89
End: 2025-04-11
Payer: MEDICARE

## 2025-04-11 ENCOUNTER — HOSPITAL ENCOUNTER (OUTPATIENT)
Facility: HOSPITAL | Age: 89
Setting detail: INFUSION SERIES
Discharge: HOME | End: 2025-04-11
Payer: MEDICARE

## 2025-04-11 VITALS
DIASTOLIC BLOOD PRESSURE: 78 MMHG | RESPIRATION RATE: 16 BRPM | WEIGHT: 184 LBS | OXYGEN SATURATION: 98 % | SYSTOLIC BLOOD PRESSURE: 132 MMHG | TEMPERATURE: 97.8 F | BODY MASS INDEX: 24.92 KG/M2 | HEART RATE: 78 BPM | HEIGHT: 72 IN

## 2025-04-11 VITALS
SYSTOLIC BLOOD PRESSURE: 136 MMHG | TEMPERATURE: 97.6 F | RESPIRATION RATE: 16 BRPM | OXYGEN SATURATION: 97 % | HEART RATE: 73 BPM | DIASTOLIC BLOOD PRESSURE: 80 MMHG

## 2025-04-11 DIAGNOSIS — Z86.718 HISTORY OF THROMBOSIS: ICD-10-CM

## 2025-04-11 DIAGNOSIS — I10 ESSENTIAL (PRIMARY) HYPERTENSION: ICD-10-CM

## 2025-04-11 DIAGNOSIS — D69.3 IDIOPATHIC THROMBOCYTOPENIC PURPURA (ITP) (HCC): ICD-10-CM

## 2025-04-11 DIAGNOSIS — D69.3 IDIOPATHIC THROMBOCYTOPENIC PURPURA (ITP) (HCC): Primary | ICD-10-CM

## 2025-04-11 DIAGNOSIS — Z51.81 ENCOUNTER FOR THERAPEUTIC DRUG LEVEL MONITORING: ICD-10-CM

## 2025-04-11 DIAGNOSIS — F03.B0 MODERATE DEMENTIA WITHOUT BEHAVIORAL DISTURBANCE, PSYCHOTIC DISTURBANCE, MOOD DISTURBANCE, OR ANXIETY, UNSPECIFIED DEMENTIA TYPE (HCC): ICD-10-CM

## 2025-04-11 LAB
ALBUMIN SERPL-MCNC: 3.4 G/DL (ref 3.5–5)
ALBUMIN/GLOB SERPL: 1 (ref 1.1–2.2)
ALP SERPL-CCNC: 130 U/L (ref 45–117)
ALT SERPL-CCNC: 18 U/L (ref 12–78)
ANION GAP SERPL CALC-SCNC: 4 MMOL/L (ref 2–12)
AST SERPL-CCNC: 21 U/L (ref 15–37)
BASOPHILS # BLD: 0.04 K/UL (ref 0–0.1)
BASOPHILS NFR BLD: 0.5 % (ref 0–1)
BILIRUB SERPL-MCNC: 0.6 MG/DL (ref 0.2–1)
BUN SERPL-MCNC: 15 MG/DL (ref 6–20)
BUN/CREAT SERPL: 16 (ref 12–20)
CALCIUM SERPL-MCNC: 8.8 MG/DL (ref 8.5–10.1)
CHLORIDE SERPL-SCNC: 108 MMOL/L (ref 97–108)
CO2 SERPL-SCNC: 30 MMOL/L (ref 21–32)
CREAT SERPL-MCNC: 0.96 MG/DL (ref 0.7–1.3)
DIFFERENTIAL METHOD BLD: ABNORMAL
EOSINOPHIL # BLD: 0.18 K/UL (ref 0–0.4)
EOSINOPHIL NFR BLD: 2.2 % (ref 0–7)
ERYTHROCYTE [DISTWIDTH] IN BLOOD BY AUTOMATED COUNT: 12.6 % (ref 11.5–14.5)
GLOBULIN SER CALC-MCNC: 3.4 G/DL (ref 2–4)
GLUCOSE SERPL-MCNC: 100 MG/DL (ref 65–100)
HCT VFR BLD AUTO: 39.1 % (ref 36.6–50.3)
HGB BLD-MCNC: 12.6 G/DL (ref 12.1–17)
IMM GRANULOCYTES # BLD AUTO: 0.05 K/UL (ref 0–0.04)
IMM GRANULOCYTES NFR BLD AUTO: 0.6 % (ref 0–0.5)
LYMPHOCYTES # BLD: 2.15 K/UL (ref 0.8–3.5)
LYMPHOCYTES NFR BLD: 25.7 % (ref 12–49)
MCH RBC QN AUTO: 30.3 PG (ref 26–34)
MCHC RBC AUTO-ENTMCNC: 32.2 G/DL (ref 30–36.5)
MCV RBC AUTO: 94 FL (ref 80–99)
MONOCYTES # BLD: 0.7 K/UL (ref 0–1)
MONOCYTES NFR BLD: 8.4 % (ref 5–13)
NEUTS SEG # BLD: 5.23 K/UL (ref 1.8–8)
NEUTS SEG NFR BLD: 62.6 % (ref 32–75)
NRBC # BLD: 0 K/UL (ref 0–0.01)
NRBC BLD-RTO: 0 PER 100 WBC
PLATELET # BLD AUTO: 327 K/UL (ref 150–400)
PMV BLD AUTO: 9.7 FL (ref 8.9–12.9)
POTASSIUM SERPL-SCNC: 3.8 MMOL/L (ref 3.5–5.1)
PROT SERPL-MCNC: 6.8 G/DL (ref 6.4–8.2)
RBC # BLD AUTO: 4.16 M/UL (ref 4.1–5.7)
SODIUM SERPL-SCNC: 142 MMOL/L (ref 136–145)
WBC # BLD AUTO: 8.4 K/UL (ref 4.1–11.1)

## 2025-04-11 PROCEDURE — 99215 OFFICE O/P EST HI 40 MIN: CPT | Performed by: NURSE PRACTITIONER

## 2025-04-11 PROCEDURE — 1160F RVW MEDS BY RX/DR IN RCRD: CPT | Performed by: NURSE PRACTITIONER

## 2025-04-11 PROCEDURE — G2211 COMPLEX E/M VISIT ADD ON: HCPCS | Performed by: NURSE PRACTITIONER

## 2025-04-11 PROCEDURE — G8420 CALC BMI NORM PARAMETERS: HCPCS | Performed by: NURSE PRACTITIONER

## 2025-04-11 PROCEDURE — 1126F AMNT PAIN NOTED NONE PRSNT: CPT | Performed by: NURSE PRACTITIONER

## 2025-04-11 PROCEDURE — 85025 COMPLETE CBC W/AUTO DIFF WBC: CPT

## 2025-04-11 PROCEDURE — 80053 COMPREHEN METABOLIC PANEL: CPT

## 2025-04-11 PROCEDURE — 36415 COLL VENOUS BLD VENIPUNCTURE: CPT

## 2025-04-11 PROCEDURE — G8427 DOCREV CUR MEDS BY ELIG CLIN: HCPCS | Performed by: NURSE PRACTITIONER

## 2025-04-11 PROCEDURE — 1123F ACP DISCUSS/DSCN MKR DOCD: CPT | Performed by: NURSE PRACTITIONER

## 2025-04-11 PROCEDURE — 1036F TOBACCO NON-USER: CPT | Performed by: NURSE PRACTITIONER

## 2025-04-11 PROCEDURE — 1159F MED LIST DOCD IN RCRD: CPT | Performed by: NURSE PRACTITIONER

## 2025-04-11 NOTE — PROGRESS NOTES
Naldo SOMMER Sherri  is a 89 y.o. male follow up for         1. Have you been to the ER, urgent care clinic since your last visit?  Hospitalized since your last visit?{no    2. Have you seen or consulted any other health care providers outside of the [facility] since your last visit?  Include any pap smears or colon screening.  In house MD  
>400,000/mm3: Withhold dose; assess platelet count twice weekly; when platelet count <150,000/mm3, resume with the daily dose reduced by 25 mg (if taking 25 mg once daily, resume with  12.5 mg once daily).   -Platelet count >400,000/mm3 after 2 weeks at the lowest dose: Discontinue treatment.     2. HTN:   Controlled on current medications.  May need to scale back if hypotension continues.      3. Dementia:  Resides in [facility].     4. Bradycardia s/p pacemaker placement:  No further falls. HR now controlled.     5. H/o superficial venous thrombosis:  Related to prior IV in R arm. Resolved. Not necessarily related to Promacta.       Signed By:  HAWK Camarena - LORRIE

## 2025-05-12 ENCOUNTER — HOSPITAL ENCOUNTER (EMERGENCY)
Facility: HOSPITAL | Age: 89
Discharge: NURSING, REHAB OR LONG-TERM CARE | End: 2025-05-12
Attending: EMERGENCY MEDICINE
Payer: MEDICARE

## 2025-05-12 VITALS
SYSTOLIC BLOOD PRESSURE: 127 MMHG | DIASTOLIC BLOOD PRESSURE: 60 MMHG | HEIGHT: 72 IN | RESPIRATION RATE: 18 BRPM | TEMPERATURE: 97.5 F | WEIGHT: 188.8 LBS | BODY MASS INDEX: 25.57 KG/M2 | OXYGEN SATURATION: 99 % | HEART RATE: 75 BPM

## 2025-05-12 DIAGNOSIS — L02.01 FACIAL ABSCESS: Primary | ICD-10-CM

## 2025-05-12 PROCEDURE — 99283 EMERGENCY DEPT VISIT LOW MDM: CPT

## 2025-05-12 ASSESSMENT — PAIN SCALES - GENERAL
PAINLEVEL_OUTOF10: 0
PAINLEVEL_OUTOF10: 0

## 2025-05-12 ASSESSMENT — PAIN - FUNCTIONAL ASSESSMENT: PAIN_FUNCTIONAL_ASSESSMENT: 0-10

## 2025-05-12 NOTE — ED TRIAGE NOTES
Patient arrives via EMS from [facility] with c/o cyst on right nasal/cheek for past two days.     Per EMS patient was started on antibiotic, but cyst has grown in size with increased redness causing facility to send patient to ED for evaluation.     Patient is alert to name, but disoriented to time, place, situation. Per EMS this is patient's baseline.

## 2025-05-13 RX ORDER — ELTROMBOPAG OLAMINE 12.5 MG/1
TABLET, FILM COATED ORAL
Qty: 30 TABLET | Refills: 5 | Status: ACTIVE | OUTPATIENT
Start: 2025-05-13

## 2025-05-13 NOTE — ED NOTES
TRANSFER - OUT REPORT:    Verbal report given to [facility] on Naldo Polanco Jr  being transferred to [facility] for transfer home    Report consisted of patient's Situation, Background, Assessment and   Recommendations(SBAR).     Information from the following report(s) Nurse Handoff Report, ED SBAR, Quality Measures, and Neuro Assessment was reviewed with the receiving nurse.    [place] Fall Assessment:    Presents to emergency department  because of falls (Syncope, seizure, or loss of consciousness): No  Age > 70: Yes  Altered Mental Status, Intoxication with alcohol or substance confusion (Disorientation, impaired judgment, poor safety awaremess, or inability to follow instructions): Yes  Impaired Mobility: Ambulates or transfers with assistive devices or assistance; Unable to ambulate or transer.: Yes             Lines:       Opportunity for questions and clarification was provided.      Patient transported with:  Tech

## 2025-05-13 NOTE — DISCHARGE INSTRUCTIONS
Continue taking your Keflex.  I recommend warm compresses every 2-3 hours at the nursing care unit to help facilitate pus drainage.  This should clear up over the next 3 to 5 days.  Return to the ER for any fever or worsening symptoms

## 2025-05-13 NOTE — ED NOTES
Spoke to Nelida Claudio at [facility] just now [phone]. Nurse stated she noticed the bump a day or two ago. It started \"pussing\" yesterday and looked better. The patient was started on cephalexin.

## 2025-05-13 NOTE — ED PROVIDER NOTES
[facility] EMERGENCY DEPARTMENT  EMERGENCY DEPARTMENT ENCOUNTER      Pt Name: Naldo Polanco Jr  MRN: [id number]  Birthdate 1935  Date of evaluation: 5/12/2025  Provider: Noah Perez MD      HISTORY OF PRESENT ILLNESS      89-year-old male with history of dementia presenting the ER for a facial lesion.  He was sent over here from a local memory care unit for an infected cyst or abscess on his face.  It was noticed a couple days ago by nursing staff.  He has been started on cephalexin.  They noticed pus draining from it today so they wanted him evaluated in the ER.  Patient is unable to provide any additional history due to his underlying dementia              Nursing Notes were reviewed.    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     Bradycardia     Dementia with aggressive behavior (HCC)     ED (erectile dysfunction)     Elevated PSA     HTN (hypertension)     Hydronephrosis     Hyperlipidemia     Right fascicular block 05/25/2016         SURGICAL HISTORY       Past Surgical History:   Procedure Laterality Date    CT BIOPSY BONE MARROW  2/1/2023    CT BONE MARROW BIOPSY 2/1/2023 [facility] RAD CT    EP DEVICE PROCEDURE N/A 2/21/2024    Insert PPM dual performed by Swetha Gaspar MD at [facility] CARDIAC CATH LAB    INVASIVE VASCULAR N/A 2/21/2024    Ultrasound guided vascular access performed by Swetha Gaspar MD at [facility] CARDIAC CATH LAB         CURRENT MEDICATIONS       Previous Medications    ACETAMINOPHEN (TYLENOL) 325 MG TABLET    Take 2 tablets by mouth every 6 hours as needed for Pain    AMMONIUM LACTATE (AMLACTIN) 12 % CREAM    Apply topically daily Apply topically to legs    ATORVASTATIN (LIPITOR) 10 MG TABLET    Take 1 tablet by mouth nightly    DONEPEZIL (ARICEPT) 10 MG TABLET    Take 1 tablet by mouth nightly

## 2025-05-23 ENCOUNTER — HOSPITAL ENCOUNTER (OUTPATIENT)
Facility: HOSPITAL | Age: 89
Setting detail: INFUSION SERIES
Discharge: HOME | End: 2025-05-23
Payer: MEDICARE

## 2025-05-23 VITALS
TEMPERATURE: 97.8 F | RESPIRATION RATE: 16 BRPM | BODY MASS INDEX: 23.63 KG/M2 | SYSTOLIC BLOOD PRESSURE: 130 MMHG | OXYGEN SATURATION: 98 % | WEIGHT: 174.2 LBS | HEART RATE: 80 BPM | DIASTOLIC BLOOD PRESSURE: 60 MMHG

## 2025-05-23 DIAGNOSIS — D69.3 IDIOPATHIC THROMBOCYTOPENIC PURPURA (ITP) (HCC): ICD-10-CM

## 2025-05-23 LAB
ALBUMIN SERPL-MCNC: 3.6 G/DL (ref 3.5–5)
ALBUMIN/GLOB SERPL: 1.1 (ref 1.1–2.2)
ALP SERPL-CCNC: 132 U/L (ref 45–117)
ALT SERPL-CCNC: 28 U/L (ref 12–78)
ANION GAP SERPL CALC-SCNC: 3 MMOL/L (ref 2–12)
AST SERPL-CCNC: 26 U/L (ref 15–37)
BASOPHILS # BLD: 0.05 K/UL (ref 0–0.1)
BASOPHILS NFR BLD: 0.7 % (ref 0–1)
BILIRUB SERPL-MCNC: 0.4 MG/DL (ref 0.2–1)
BUN SERPL-MCNC: 19 MG/DL (ref 6–20)
BUN/CREAT SERPL: 21 (ref 12–20)
CALCIUM SERPL-MCNC: 8.9 MG/DL (ref 8.5–10.1)
CHLORIDE SERPL-SCNC: 108 MMOL/L (ref 97–108)
CO2 SERPL-SCNC: 30 MMOL/L (ref 21–32)
CREAT SERPL-MCNC: 0.92 MG/DL (ref 0.7–1.3)
DIFFERENTIAL METHOD BLD: NORMAL
EOSINOPHIL # BLD: 0.17 K/UL (ref 0–0.4)
EOSINOPHIL NFR BLD: 2.3 % (ref 0–7)
ERYTHROCYTE [DISTWIDTH] IN BLOOD BY AUTOMATED COUNT: 12.4 % (ref 11.5–14.5)
GLOBULIN SER CALC-MCNC: 3.3 G/DL (ref 2–4)
GLUCOSE SERPL-MCNC: 115 MG/DL (ref 65–100)
HCT VFR BLD AUTO: 38.4 % (ref 36.6–50.3)
HGB BLD-MCNC: 12.3 G/DL (ref 12.1–17)
IMM GRANULOCYTES # BLD AUTO: 0.03 K/UL (ref 0–0.04)
IMM GRANULOCYTES NFR BLD AUTO: 0.4 % (ref 0–0.5)
LYMPHOCYTES # BLD: 2.53 K/UL (ref 0.8–3.5)
LYMPHOCYTES NFR BLD: 33.5 % (ref 12–49)
MCH RBC QN AUTO: 29.9 PG (ref 26–34)
MCHC RBC AUTO-ENTMCNC: 32 G/DL (ref 30–36.5)
MCV RBC AUTO: 93.2 FL (ref 80–99)
MONOCYTES # BLD: 0.69 K/UL (ref 0–1)
MONOCYTES NFR BLD: 9.1 % (ref 5–13)
NEUTS SEG # BLD: 4.08 K/UL (ref 1.8–8)
NEUTS SEG NFR BLD: 54 % (ref 32–75)
NRBC # BLD: 0 K/UL (ref 0–0.01)
NRBC BLD-RTO: 0 PER 100 WBC
PLATELET # BLD AUTO: 282 K/UL (ref 150–400)
PMV BLD AUTO: 9.4 FL (ref 8.9–12.9)
POTASSIUM SERPL-SCNC: 4.1 MMOL/L (ref 3.5–5.1)
PROT SERPL-MCNC: 6.9 G/DL (ref 6.4–8.2)
RBC # BLD AUTO: 4.12 M/UL (ref 4.1–5.7)
SODIUM SERPL-SCNC: 141 MMOL/L (ref 136–145)
WBC # BLD AUTO: 7.6 K/UL (ref 4.1–11.1)

## 2025-05-23 PROCEDURE — 36415 COLL VENOUS BLD VENIPUNCTURE: CPT

## 2025-05-23 PROCEDURE — 85025 COMPLETE CBC W/AUTO DIFF WBC: CPT

## 2025-05-23 PROCEDURE — 80053 COMPREHEN METABOLIC PANEL: CPT

## 2025-07-03 ENCOUNTER — HOSPITAL ENCOUNTER (OUTPATIENT)
Facility: HOSPITAL | Age: 89
Setting detail: INFUSION SERIES
Discharge: HOME | End: 2025-07-03
Payer: MEDICARE

## 2025-07-03 ENCOUNTER — OFFICE VISIT (OUTPATIENT)
Age: 89
End: 2025-07-03
Payer: MEDICARE

## 2025-07-03 VITALS
DIASTOLIC BLOOD PRESSURE: 68 MMHG | OXYGEN SATURATION: 96 % | WEIGHT: 174.6 LBS | HEIGHT: 72 IN | BODY MASS INDEX: 23.65 KG/M2 | RESPIRATION RATE: 16 BRPM | SYSTOLIC BLOOD PRESSURE: 124 MMHG | HEART RATE: 74 BPM | TEMPERATURE: 97.7 F

## 2025-07-03 VITALS
RESPIRATION RATE: 16 BRPM | SYSTOLIC BLOOD PRESSURE: 132 MMHG | OXYGEN SATURATION: 99 % | DIASTOLIC BLOOD PRESSURE: 60 MMHG | HEART RATE: 70 BPM | TEMPERATURE: 97.5 F

## 2025-07-03 DIAGNOSIS — F03.B0 MODERATE DEMENTIA WITHOUT BEHAVIORAL DISTURBANCE, PSYCHOTIC DISTURBANCE, MOOD DISTURBANCE, OR ANXIETY, UNSPECIFIED DEMENTIA TYPE (HCC): ICD-10-CM

## 2025-07-03 DIAGNOSIS — D69.3 IDIOPATHIC THROMBOCYTOPENIC PURPURA (ITP) (HCC): ICD-10-CM

## 2025-07-03 DIAGNOSIS — Z79.899 OTHER LONG TERM (CURRENT) DRUG THERAPY: ICD-10-CM

## 2025-07-03 DIAGNOSIS — Z51.81 ENCOUNTER FOR THERAPEUTIC DRUG LEVEL MONITORING: ICD-10-CM

## 2025-07-03 DIAGNOSIS — D69.3 IDIOPATHIC THROMBOCYTOPENIC PURPURA (ITP) (HCC): Primary | ICD-10-CM

## 2025-07-03 LAB
ALBUMIN SERPL-MCNC: 3.5 G/DL (ref 3.5–5)
ALBUMIN/GLOB SERPL: 1.1 (ref 1.1–2.2)
ALP SERPL-CCNC: 137 U/L (ref 45–117)
ALT SERPL-CCNC: 20 U/L (ref 12–78)
ANION GAP SERPL CALC-SCNC: 3 MMOL/L (ref 2–12)
AST SERPL-CCNC: 24 U/L (ref 15–37)
BASOPHILS # BLD: 0.04 K/UL (ref 0–0.1)
BASOPHILS NFR BLD: 0.6 % (ref 0–1)
BILIRUB SERPL-MCNC: 0.6 MG/DL (ref 0.2–1)
BUN SERPL-MCNC: 14 MG/DL (ref 6–20)
BUN/CREAT SERPL: 15 (ref 12–20)
CALCIUM SERPL-MCNC: 8.9 MG/DL (ref 8.5–10.1)
CHLORIDE SERPL-SCNC: 108 MMOL/L (ref 97–108)
CO2 SERPL-SCNC: 30 MMOL/L (ref 21–32)
CREAT SERPL-MCNC: 0.96 MG/DL (ref 0.7–1.3)
DIFFERENTIAL METHOD BLD: NORMAL
EOSINOPHIL # BLD: 0.17 K/UL (ref 0–0.4)
EOSINOPHIL NFR BLD: 2.4 % (ref 0–7)
ERYTHROCYTE [DISTWIDTH] IN BLOOD BY AUTOMATED COUNT: 12.5 % (ref 11.5–14.5)
GLOBULIN SER CALC-MCNC: 3.3 G/DL (ref 2–4)
GLUCOSE SERPL-MCNC: 129 MG/DL (ref 65–100)
HCT VFR BLD AUTO: 40.1 % (ref 36.6–50.3)
HGB BLD-MCNC: 13 G/DL (ref 12.1–17)
IMM GRANULOCYTES # BLD AUTO: 0.01 K/UL (ref 0–0.04)
IMM GRANULOCYTES NFR BLD AUTO: 0.1 % (ref 0–0.5)
LYMPHOCYTES # BLD: 2.01 K/UL (ref 0.8–3.5)
LYMPHOCYTES NFR BLD: 28.6 % (ref 12–49)
MCH RBC QN AUTO: 30 PG (ref 26–34)
MCHC RBC AUTO-ENTMCNC: 32.4 G/DL (ref 30–36.5)
MCV RBC AUTO: 92.4 FL (ref 80–99)
MONOCYTES # BLD: 0.55 K/UL (ref 0–1)
MONOCYTES NFR BLD: 7.8 % (ref 5–13)
NEUTS SEG # BLD: 4.25 K/UL (ref 1.8–8)
NEUTS SEG NFR BLD: 60.5 % (ref 32–75)
NRBC # BLD: 0 K/UL (ref 0–0.01)
NRBC BLD-RTO: 0 PER 100 WBC
PLATELET # BLD AUTO: 254 K/UL (ref 150–400)
PMV BLD AUTO: 9.5 FL (ref 8.9–12.9)
POTASSIUM SERPL-SCNC: 3.6 MMOL/L (ref 3.5–5.1)
PROT SERPL-MCNC: 6.8 G/DL (ref 6.4–8.2)
RBC # BLD AUTO: 4.34 M/UL (ref 4.1–5.7)
SODIUM SERPL-SCNC: 141 MMOL/L (ref 136–145)
WBC # BLD AUTO: 7 K/UL (ref 4.1–11.1)

## 2025-07-03 PROCEDURE — G2211 COMPLEX E/M VISIT ADD ON: HCPCS | Performed by: INTERNAL MEDICINE

## 2025-07-03 PROCEDURE — 36415 COLL VENOUS BLD VENIPUNCTURE: CPT

## 2025-07-03 PROCEDURE — 1036F TOBACCO NON-USER: CPT | Performed by: INTERNAL MEDICINE

## 2025-07-03 PROCEDURE — 99214 OFFICE O/P EST MOD 30 MIN: CPT | Performed by: INTERNAL MEDICINE

## 2025-07-03 PROCEDURE — 1159F MED LIST DOCD IN RCRD: CPT | Performed by: INTERNAL MEDICINE

## 2025-07-03 PROCEDURE — 1123F ACP DISCUSS/DSCN MKR DOCD: CPT | Performed by: INTERNAL MEDICINE

## 2025-07-03 PROCEDURE — 80053 COMPREHEN METABOLIC PANEL: CPT

## 2025-07-03 PROCEDURE — 1126F AMNT PAIN NOTED NONE PRSNT: CPT | Performed by: INTERNAL MEDICINE

## 2025-07-03 PROCEDURE — G8420 CALC BMI NORM PARAMETERS: HCPCS | Performed by: INTERNAL MEDICINE

## 2025-07-03 PROCEDURE — G8427 DOCREV CUR MEDS BY ELIG CLIN: HCPCS | Performed by: INTERNAL MEDICINE

## 2025-07-03 PROCEDURE — 85025 COMPLETE CBC W/AUTO DIFF WBC: CPT

## 2025-07-03 NOTE — PROGRESS NOTES
Naldo Polanco Jr is a 89 y.o. male follow up for         1. Have you been to the ER, urgent care clinic since your last visit?  Hospitalized since your last visit?{ER [facility] infected cyst on face    2. Have you seen or consulted any other health care providers outside of the [facility] since your last visit?  Include any pap smears or colon screening. no

## 2025-07-03 NOTE — PROGRESS NOTES
[facility] [place] at [facility]   [address] [address], [address] [facility] [address]   W: [phone]  F: [phone]     Reason for Visit:   Naldo Polanco Jr is a 89 y.o. male who is seen for follow up of ITP.     Hematology Oncology Treatment History:       Diagnosis: ITP     Pathology:   2/1/23 Bone marrow biopsy: Normocellular marrow with maturing trilineage hematopoiesis including   mildly left shifted granulopoiesis  without increase in blasts, see comment.   Flow cytometry shows no diagnostic immunophenotypic abnormalities   Peripheral Blood:   Normochromic, normocytic anemia with no increase in schistocytes   Thrombocytopenia. White blood cells normal in number and morphology   CYTOGENETICS - Karyotype:45,X,-Y[16]/46,XY[4]; Interpretation: ABNORMAL MALE KARYOTYPE      Prior Treatment:   Dexamethasone 9/2022 with good response  Dex 40mg/d x 4 and IVIG on 1/18/23 and 1/19/23     Current Treatment: Promacta 50mg/d started 2/23/23, decreased to Promacta 25mg 3/14/23 and decreased to Promacta 12.5mg on 3/28/23.         History of Present Illness:   Naldo Polanco Jr is a pleasant 89 y.o. male with dementia who is seen for follow up of ITP. Pt ambulates without assistance. He lives at [facility]; has been there since Sept 2022.   Has 2 daughters. Barb lives locally and 2nd daughter lives in [place].      Interval History:  Pt seen for follow up of ITP and monitoring of Promacta.   For the past 1 week, patient's daughter notes pt has been more lethargic. Staff at his facility has described pt acting out of character, more combative, cursing - which is very different from his baseline. Pt is unable to provide any history today when asked about dysuria, cough, etc.     Accompanied by daughter, Barb.         PMHX: BPH, Dementia, ED, HTN, Hydronephrosis, HLD, Right fascicular block  PSurgHx: None  SHX: , has significant other currently. Lives in [facility]. Never

## 2025-07-13 PROCEDURE — 93294 REM INTERROG EVL PM/LDLS PM: CPT | Performed by: INTERNAL MEDICINE

## 2025-08-28 ENCOUNTER — OFFICE VISIT (OUTPATIENT)
Age: 89
End: 2025-08-28
Payer: MEDICARE

## 2025-08-28 ENCOUNTER — HOSPITAL ENCOUNTER (OUTPATIENT)
Facility: HOSPITAL | Age: 89
Setting detail: INFUSION SERIES
Discharge: HOME | End: 2025-08-28
Payer: MEDICARE

## 2025-08-28 VITALS
OXYGEN SATURATION: 100 % | SYSTOLIC BLOOD PRESSURE: 126 MMHG | DIASTOLIC BLOOD PRESSURE: 69 MMHG | RESPIRATION RATE: 16 BRPM | TEMPERATURE: 97.1 F | HEART RATE: 62 BPM

## 2025-08-28 DIAGNOSIS — Z51.81 ENCOUNTER FOR THERAPEUTIC DRUG LEVEL MONITORING: ICD-10-CM

## 2025-08-28 DIAGNOSIS — F03.B0 MODERATE DEMENTIA WITHOUT BEHAVIORAL DISTURBANCE, PSYCHOTIC DISTURBANCE, MOOD DISTURBANCE, OR ANXIETY, UNSPECIFIED DEMENTIA TYPE (HCC): ICD-10-CM

## 2025-08-28 DIAGNOSIS — L02.91 ABSCESS: ICD-10-CM

## 2025-08-28 DIAGNOSIS — D69.3 IDIOPATHIC THROMBOCYTOPENIC PURPURA (ITP) (HCC): ICD-10-CM

## 2025-08-28 DIAGNOSIS — D69.3 IDIOPATHIC THROMBOCYTOPENIC PURPURA (ITP) (HCC): Primary | ICD-10-CM

## 2025-08-28 LAB
ALBUMIN SERPL-MCNC: 3.4 G/DL (ref 3.5–5.2)
ALBUMIN/GLOB SERPL: 1.1 (ref 1.1–2.2)
ALP SERPL-CCNC: 118 U/L (ref 40–129)
ALT SERPL-CCNC: 15 U/L (ref 10–50)
ANION GAP SERPL CALC-SCNC: 8 MMOL/L (ref 2–14)
AST SERPL-CCNC: 22 U/L (ref 10–50)
BASOPHILS # BLD: 0.04 K/UL (ref 0–0.1)
BASOPHILS NFR BLD: 0.6 % (ref 0–1)
BILIRUB SERPL-MCNC: 0.4 MG/DL (ref 0–1.2)
BUN SERPL-MCNC: 17 MG/DL (ref 8–23)
BUN/CREAT SERPL: 19 (ref 12–20)
CALCIUM SERPL-MCNC: 9 MG/DL (ref 8.8–10.2)
CHLORIDE SERPL-SCNC: 105 MMOL/L (ref 98–107)
CO2 SERPL-SCNC: 29 MMOL/L (ref 20–29)
CREAT SERPL-MCNC: 0.88 MG/DL (ref 0.7–1.2)
DIFFERENTIAL METHOD BLD: ABNORMAL
EOSINOPHIL # BLD: 0.11 K/UL (ref 0–0.4)
EOSINOPHIL NFR BLD: 1.6 % (ref 0–7)
ERYTHROCYTE [DISTWIDTH] IN BLOOD BY AUTOMATED COUNT: 12.9 % (ref 11.5–14.5)
GLOBULIN SER CALC-MCNC: 3.1 G/DL (ref 2–4)
GLUCOSE SERPL-MCNC: 103 MG/DL (ref 65–100)
HCT VFR BLD AUTO: 35.9 % (ref 36.6–50.3)
HGB BLD-MCNC: 11.9 G/DL (ref 12.1–17)
IMM GRANULOCYTES # BLD AUTO: 0.02 K/UL (ref 0–0.04)
IMM GRANULOCYTES NFR BLD AUTO: 0.3 % (ref 0–0.5)
LYMPHOCYTES # BLD: 2 K/UL (ref 0.8–3.5)
LYMPHOCYTES NFR BLD: 29 % (ref 12–49)
MCH RBC QN AUTO: 30.7 PG (ref 26–34)
MCHC RBC AUTO-ENTMCNC: 33.1 G/DL (ref 30–36.5)
MCV RBC AUTO: 92.5 FL (ref 80–99)
MONOCYTES # BLD: 0.65 K/UL (ref 0–1)
MONOCYTES NFR BLD: 9.4 % (ref 5–13)
NEUTS SEG # BLD: 4.07 K/UL (ref 1.8–8)
NEUTS SEG NFR BLD: 59.1 % (ref 32–75)
NRBC # BLD: 0 K/UL (ref 0–0.01)
NRBC BLD-RTO: 0 PER 100 WBC
PLATELET # BLD AUTO: 220 K/UL (ref 150–400)
PMV BLD AUTO: 9.5 FL (ref 8.9–12.9)
POTASSIUM SERPL-SCNC: 3.9 MMOL/L (ref 3.5–5.1)
PROT SERPL-MCNC: 6.5 G/DL (ref 6.4–8.3)
RBC # BLD AUTO: 3.88 M/UL (ref 4.1–5.7)
SODIUM SERPL-SCNC: 142 MMOL/L (ref 136–145)
WBC # BLD AUTO: 6.9 K/UL (ref 4.1–11.1)

## 2025-08-28 PROCEDURE — 85025 COMPLETE CBC W/AUTO DIFF WBC: CPT

## 2025-08-28 PROCEDURE — 99214 OFFICE O/P EST MOD 30 MIN: CPT | Performed by: INTERNAL MEDICINE

## 2025-08-28 PROCEDURE — 80053 COMPREHEN METABOLIC PANEL: CPT

## 2025-08-28 PROCEDURE — 36415 COLL VENOUS BLD VENIPUNCTURE: CPT

## 2025-08-28 PROCEDURE — 1036F TOBACCO NON-USER: CPT | Performed by: INTERNAL MEDICINE

## 2025-08-28 PROCEDURE — 1123F ACP DISCUSS/DSCN MKR DOCD: CPT | Performed by: INTERNAL MEDICINE

## 2025-08-28 PROCEDURE — G8428 CUR MEDS NOT DOCUMENT: HCPCS | Performed by: INTERNAL MEDICINE

## 2025-08-28 PROCEDURE — G8420 CALC BMI NORM PARAMETERS: HCPCS | Performed by: INTERNAL MEDICINE

## (undated) DEVICE — SUTURE MNFLMNT SH 26 MM 1/2 CI CRCLE TPR PNT SYMTRC PD PLU

## (undated) DEVICE — SUTURE ABSORBABLE MONOFILAMENT 2-0 CT1 12 IN UD MONOCRYL + SXMP1B412

## (undated) DEVICE — 5F (1.0MM ID) X 9CM5F (1.0MM ID) REGULAR MICRO-STICK® INTRODUCER SETINTRODUCER SET WITH NITINOL GUIDEWIREWITH NITIN WITH RADIOPAQUE TIPWITH RADIOPAQ: Brand: MICRO-STICK SETMICRO-STICK SET

## (undated) DEVICE — PACEMAKER PACK: Brand: MEDLINE INDUSTRIES, INC.

## (undated) DEVICE — COVER US PRB W15XL120CM W/ GEL RUBBERBAND TAPE STRP FLD GEN

## (undated) DEVICE — GUIDEWIRE VASC L80CM DIA0.018IN TIP L5CM 15DEG ANG NIT

## (undated) DEVICE — LIQUIBAND RAPID ADHESIVE 36/CS 0.8ML: Brand: MEDLINE

## (undated) DEVICE — KIT INTRO 9FR L13CM DIA0.118IN SPLITTABLE HEMSTAT ROBUST

## (undated) DEVICE — SYRINGE IRRIG 60ML SFT PLIABLE BLB EZ TO GRP 1 HND USE W/

## (undated) DEVICE — 3M™ IOBAN™ 2 ANTIMICROBIAL INCISE DRAPE 6640EZ: Brand: IOBAN™ 2

## (undated) DEVICE — DRESSING ANTIMIC FOAM OPTIFOAM POSTOP ADH 4 X 6 IN

## (undated) DEVICE — INTRODUCER SHTH L13CM OD7FR SH ORNG HUB SEAMLESS SAFSHTH

## (undated) DEVICE — STRIP,CLOSURE,WOUND,MEDI-STRIP,1/2X4: Brand: MEDLINE

## (undated) DEVICE — SUTURE STRATAFIX SPRL MCRYL + SZ 4-0 L12IN ABSRB UD PS-2 SXMP1B117

## (undated) DEVICE — SUTURE PERMA-HAND SZ 0 L30IN NONABSORBABLE BLK L36MM CT-1 424H

## (undated) DEVICE — APPLICATOR MEDICATED 26 CC SOLUTION HI LT ORNG CHLORAPREP